# Patient Record
Sex: FEMALE | Race: WHITE | NOT HISPANIC OR LATINO | Employment: FULL TIME | URBAN - METROPOLITAN AREA
[De-identification: names, ages, dates, MRNs, and addresses within clinical notes are randomized per-mention and may not be internally consistent; named-entity substitution may affect disease eponyms.]

---

## 2017-02-18 ENCOUNTER — HOSPITAL ENCOUNTER (EMERGENCY)
Facility: HOSPITAL | Age: 32
Discharge: HOME | End: 2017-02-18
Attending: EMERGENCY MEDICINE
Payer: COMMERCIAL

## 2017-02-18 VITALS
OXYGEN SATURATION: 99 % | TEMPERATURE: 99 F | DIASTOLIC BLOOD PRESSURE: 76 MMHG | HEART RATE: 98 BPM | SYSTOLIC BLOOD PRESSURE: 134 MMHG | RESPIRATION RATE: 18 BRPM

## 2017-02-18 DIAGNOSIS — I47.10 SVT (SUPRAVENTRICULAR TACHYCARDIA): Primary | ICD-10-CM

## 2017-02-18 DIAGNOSIS — R00.2 PALPITATIONS: ICD-10-CM

## 2017-02-18 LAB
ALBUMIN SERPL BCP-MCNC: 3.4 G/DL
ALP SERPL-CCNC: 55 U/L
ALT SERPL W/O P-5'-P-CCNC: 27 U/L
ANION GAP SERPL CALC-SCNC: 11 MMOL/L
AST SERPL-CCNC: 21 U/L
B-HCG UR QL: NEGATIVE
BASOPHILS # BLD AUTO: 0.02 K/UL
BASOPHILS NFR BLD: 0.2 %
BILIRUB SERPL-MCNC: 0.9 MG/DL
BUN SERPL-MCNC: 14 MG/DL
CALCIUM SERPL-MCNC: 8.7 MG/DL
CHLORIDE SERPL-SCNC: 108 MMOL/L
CO2 SERPL-SCNC: 20 MMOL/L
CREAT SERPL-MCNC: 0.9 MG/DL
DIFFERENTIAL METHOD: ABNORMAL
EOSINOPHIL # BLD AUTO: 0.2 K/UL
EOSINOPHIL NFR BLD: 1.7 %
ERYTHROCYTE [DISTWIDTH] IN BLOOD BY AUTOMATED COUNT: 14.3 %
EST. GFR  (AFRICAN AMERICAN): >60 ML/MIN/1.73 M^2
EST. GFR  (NON AFRICAN AMERICAN): >60 ML/MIN/1.73 M^2
GLUCOSE SERPL-MCNC: 87 MG/DL
HCT VFR BLD AUTO: 43 %
HGB BLD-MCNC: 14.8 G/DL
LYMPHOCYTES # BLD AUTO: 5.2 K/UL
LYMPHOCYTES NFR BLD: 45.6 %
MCH RBC QN AUTO: 31.8 PG
MCHC RBC AUTO-ENTMCNC: 34.4 %
MCV RBC AUTO: 93 FL
MONOCYTES # BLD AUTO: 1.1 K/UL
MONOCYTES NFR BLD: 9.3 %
NEUTROPHILS # BLD AUTO: 4.9 K/UL
NEUTROPHILS NFR BLD: 43.2 %
PLATELET # BLD AUTO: 220 K/UL
PMV BLD AUTO: 11.4 FL
POTASSIUM SERPL-SCNC: 3.8 MMOL/L
PROT SERPL-MCNC: 6.8 G/DL
RBC # BLD AUTO: 4.65 M/UL
SODIUM SERPL-SCNC: 139 MMOL/L
TROPONIN I SERPL DL<=0.01 NG/ML-MCNC: 0.01 NG/ML
TSH SERPL DL<=0.005 MIU/L-ACNC: 2.83 UIU/ML
WBC # BLD AUTO: 11.35 K/UL

## 2017-02-18 PROCEDURE — 93005 ELECTROCARDIOGRAM TRACING: CPT

## 2017-02-18 PROCEDURE — 25000003 PHARM REV CODE 250: Performed by: EMERGENCY MEDICINE

## 2017-02-18 PROCEDURE — 99284 EMERGENCY DEPT VISIT MOD MDM: CPT | Mod: 25

## 2017-02-18 PROCEDURE — 93010 ELECTROCARDIOGRAM REPORT: CPT | Mod: ,,, | Performed by: INTERNAL MEDICINE

## 2017-02-18 PROCEDURE — 96374 THER/PROPH/DIAG INJ IV PUSH: CPT

## 2017-02-18 PROCEDURE — 84443 ASSAY THYROID STIM HORMONE: CPT

## 2017-02-18 PROCEDURE — 81025 URINE PREGNANCY TEST: CPT

## 2017-02-18 PROCEDURE — 85027 COMPLETE CBC AUTOMATED: CPT

## 2017-02-18 PROCEDURE — 84484 ASSAY OF TROPONIN QUANT: CPT

## 2017-02-18 PROCEDURE — 85007 BL SMEAR W/DIFF WBC COUNT: CPT

## 2017-02-18 PROCEDURE — 80053 COMPREHEN METABOLIC PANEL: CPT

## 2017-02-18 PROCEDURE — 96361 HYDRATE IV INFUSION ADD-ON: CPT

## 2017-02-18 PROCEDURE — 63600175 PHARM REV CODE 636 W HCPCS

## 2017-02-18 PROCEDURE — 36415 COLL VENOUS BLD VENIPUNCTURE: CPT

## 2017-02-18 RX ORDER — SODIUM CHLORIDE 9 MG/ML
1000 INJECTION, SOLUTION INTRAVENOUS
Status: COMPLETED | OUTPATIENT
Start: 2017-02-18 | End: 2017-02-18

## 2017-02-18 RX ORDER — ADENOSINE 3 MG/ML
INJECTION INTRAVENOUS
Status: COMPLETED
Start: 2017-02-18 | End: 2017-02-18

## 2017-02-18 RX ORDER — ADENOSINE 3 MG/ML
6 INJECTION INTRAVENOUS
Status: COMPLETED | OUTPATIENT
Start: 2017-02-18 | End: 2017-02-18

## 2017-02-18 RX ADMIN — ADENOSINE 6 MG: 3 SOLUTION INTRAVENOUS at 06:02

## 2017-02-18 RX ADMIN — SODIUM CHLORIDE 1000 ML: 0.9 INJECTION, SOLUTION INTRAVENOUS at 06:02

## 2017-02-18 RX ADMIN — ADENOSINE 6 MG: 3 INJECTION INTRAVENOUS at 06:02

## 2017-02-18 NOTE — ED PROVIDER NOTES
Encounter Date: 2/18/2017       History     Chief Complaint   Patient presents with    Tachycardia     Review of patient's allergies indicates:  No Known Allergies  HPI  History reviewed. No pertinent past medical history.  Past Medical History Pertinent Negatives   Diagnosis Date Noted    Abnormal Pap smear 9/2/2014    Abnormal Pap smear of cervix 9/13/2016    Abnormal Pap smear of vagina 9/29/2015    Coronary artery disease 2/18/2017     Past Surgical History   Procedure Laterality Date    [place] tooth extraction       Family History   Problem Relation Age of Onset    Colon cancer Paternal Aunt      great aunt    Breast cancer Neg Hx     Ovarian cancer Neg Hx     Uterine cancer Neg Hx     Cervical cancer Neg Hx      Social History   Substance Use Topics    Smoking status: Never Smoker    Smokeless tobacco: Never Used    Alcohol use 0.0 oz/week     0 Standard drinks or equivalent per week      Comment: seldom     Review of Systems    Physical Exam   Initial Vitals   BP Pulse Resp Temp SpO2   02/18/17 0649 02/18/17 0643 02/18/17 0649 02/18/17 0649 02/18/17 0649   155/90 210 20 98.6 °F (37 °C) 100 %     Physical Exam    ED Course   Procedures  Labs Reviewed   CBC W/ AUTO DIFFERENTIAL - Abnormal; Notable for the following:        Result Value    MCH 31.8 (*)     Lymph # 5.2 (*)     Mono # 1.1 (*)     All other components within normal limits    Narrative:     PLEASE REVIEW ORDER START TIME BEFORE MARKING SPECIMEN  COLLECTED.   COMPREHENSIVE METABOLIC PANEL - Abnormal; Notable for the following:     CO2 20 (*)     Albumin 3.4 (*)     All other components within normal limits   TROPONIN I   PREGNANCY TEST, URINE RAPID   TSH   TSH                               ED Course     Clinical Impression:   {Add your Clinical Impression here. If you haven't documented one yet, please pend the note, finalize a Clinical Impression, and refresh your note before signing.:[id number]}

## 2017-02-18 NOTE — ED AVS SNAPSHOT
[facility] -   [address] [place] [address]               Jo-Ann Ochoa   2017  6:46 AM   ED    Description:  Female : 1985   Department:  [facility] -            Your Care was Coordinated By:     Provider Role From To    Edson Wright MD Attending Provider 17 0657 --      Reason for Visit     Tachycardia           Diagnoses this Visit        Comments    SVT (supraventricular tachycardia)    -  Primary     Palpitations           ED Disposition     None           To Do List           Follow-up Information     Follow up with Sheeba Dowling MD. Schedule an appointment as soon as possible for a visit in 3 days.    Specialty:  Family Medicine    Why:  Follow-up with your primary care doctor in the next 2-3 days for recheck.  Return to emergency department for any worsening signs or symptoms    Contact information:    [address] [place] [address]  [phone]          Follow up with Asif Ochoa MD. Schedule an appointment as soon as possible for a visit in 1 week.    Specialty:  Cardiology    Why:  Follow-up with your cardiologist as discussed.  Discussed possible outpatient Holter monitor.  Return to emergency department for any worsening signs or symptoms.    Contact information:    [address]  [place] [address]  [phone]        [facility]     [facility] -  Assistance  Registered nurses in the [facility] provide clinical advisement, health education, appointment booking, and other advisory services.  Call for this free service at [phone].             Medications           These medications were administered today        Dose Freq    adenosine (ADENOCARD) 3 mg/mL injection      Notes to Pharmacy: Created by cabinet override    0.9%  NaCl infusion 1,000 mL ED 1 Time    Sig: Inject 1,000 mLs into the vein ED 1 Time.    Class: Normal    Route: Intravenous     adenosine injection 6 mg 6 mg ED 1 Time    Sig: Inject 2 mLs (6 mg total) into the vein ED 1 Time.    Class: Normal    Route: Intravenous           Verify that the below list of medications is an accurate representation of the medications you are currently taking.  If none reported, the list may be blank. If incorrect, please contact your healthcare provider. Carry this list with you in case of emergency.           Current Medications     levonorgestrel-ethinyl estradiol (SEASONALE) 0.15-30 mg-mcg per tablet Take 1 tablet by mouth once daily.           Clinical Reference Information           Your Vitals Were     BP Pulse Temp Resp Last Period SpO2    134/76 105 98.6 °F (37 °C) (Oral) 16 (LMP Unknown) 99%      Allergies as of 2/18/2017     No Known Allergies      Immunizations Administered on Date of Encounter - 2/18/2017     None      ED Micro, Lab, POCT     Start Ordered       Status Ordering Provider    02/18/17 0812 02/18/17 0812  Rapid Pregnancy, Urine  STAT      Acknowledged     02/18/17 0812 02/18/17 0812  TSH  Add-on      Completed     02/18/17 0728 02/18/17 0727  Troponin I  STAT      Final result     02/18/17 0727 02/18/17 0727  Comprehensive metabolic panel  STAT      Final result     02/18/17 0727 02/18/17 0727  TSH  Once      In process     02/18/17 0657 02/18/17 0657  CBC auto differential  STAT      Final result     02/18/17 0657 02/18/17 0657    STAT,   Status:  Canceled      Canceled     02/18/17 0657 02/18/17 0657  Troponin I  Now then every 3 hours     Comments:  PLEASE REVIEW ORDER START TIME BEFORE MARKING SPECIMEN COLLECTED.   Start Status   02/18/17 0657 Canceled   02/18/17 0957 Acknowledged       Acknowledged       ED Imaging Orders     Start Ordered       Status Ordering Provider    02/18/17 0658 02/18/17 0657  X-Ray Chest AP Portable  1 time imaging      Final result       Discharge References/Attachments     PALPITATIONS (ENGLISH)    TACHYCARDIA: PAT  (ENGLISH)    VALSALVA MANEUVER  (ENGLISH)       [facility] -  complies with applicable Federal civil rights laws and does not discriminate on the basis of race, color, national origin, age, disability, or sex.        Language Assistance Services     ATTENTION: Language assistance services are available, free of charge. Please call [phone].      ATENCIÓN: Si habla deepali, tiene a hong disposición servicios gratuitos de asistencia lingüística. Llame al [phone].     CHÚ Ý: N?u b?n nói Ti?ng Vi?t, có các d?ch v? h? tr? ngôn ng? mi?n phí dành cho b?n. G?i s? [phone].

## 2017-02-18 NOTE — ED NOTES
Patient sitting up in bed, no acute distress noted, awake, alert, and oriented x 3, calm, respirations even and unlabored, and skin is warm and dry. Patient verbalized understanding of status and plan of care. Patient denies needs at this time. Side rails up x 2, call light in reach, bed low and locked.Will continue to monitor.

## 2017-02-18 NOTE — ED PROVIDER NOTES
SCRIBE #1 NOTE: I, Deb Villalobos, am scribing for, and in the presence of, Edson Wright MD. I have scribed the entire note.      History      Chief Complaint   Patient presents with    Tachycardia       Review of patient's allergies indicates:  No Known Allergies     HPI   HPI    2/18/2017, 6:57 AM   History obtained from the patient      History of Present Illness: Jo-Ann Ochoa is a 31 y.o. female patient who presents to the Emergency Department for palpitations which onset gradually approximately 20 minutes ago. Symptoms are episodic and moderate in severity. 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 Pt reports that she then started to feel palpitations, which led her to feeling dizzy and SOB. Pt notes that she drinks caffeine regularly. Pt states that she had one cup of coffee before the beginning of her shift, and another mid-day. No mitigating or exacerbating factors reported. Patient denies any CP, leg swelling, cough, HA, nausea, vomiting, jaw pain, extremity weakness/numbness, and all other sxs at this time. No further complaints or concerns at this time.         Arrival mode: Personal vehicle    PCP: Sheeba Dowling MD       Past Medical History:  History reviewed. No pertinent past medical history.    Past Surgical History:  Past Surgical History   Procedure Laterality Date    [place] tooth extraction           Family History:  Family History   Problem Relation Age of Onset    Colon cancer Paternal Aunt      great aunt    Breast cancer Neg Hx     Ovarian cancer Neg Hx     Uterine cancer Neg Hx     Cervical cancer Neg Hx        Social History:  Social History     Social History Main Topics    Smoking status: Never Smoker    Smokeless tobacco: Never Used    Alcohol use 0.0 oz/week     0 Standard drinks or equivalent per week      Comment: seldom    Drug use: No    Sexual activity: Yes     Partners: Male     Birth control/ protection: OCP       Comment: mut monog       ROS   Review of Systems   Constitutional: Negative for fever.   HENT: Negative for sore throat.    Respiratory: Negative for chest tightness and shortness of breath.    Cardiovascular: Positive for palpitations. Negative for chest pain and leg swelling.   Gastrointestinal: Negative for nausea and vomiting.   Genitourinary: Negative for dysuria.   Musculoskeletal: Negative for back pain.   Skin: Negative for rash.   Neurological: Positive for dizziness. Negative for weakness, light-headedness and headaches.   Hematological: Does not bruise/bleed easily.       Physical Exam    Initial Vitals   BP Pulse Resp Temp SpO2   02/18/17 0649 02/18/17 0649 02/18/17 0649 02/18/17 0649 02/18/17 0649   155/90 210 20 98.6 °F (37 °C) 100 %      Physical Exam  Nursing Notes and Vital Signs Reviewed.  Constitutional: Patient is in no apparent distress. Awake and alert. Well-developed and well-nourished.  Head: Atraumatic. Normocephalic.  Eyes: PERRL. EOM intact. Conjunctivae are not pale. No scleral icterus.  ENT: Mucous membranes are moist. Oropharynx is clear and symmetric.    Neck: Supple. Full ROM. No lymphadenopathy.  Cardiovascular: Tachycardic. Regular rhythm. No murmurs, rubs, or gallops. Distal pulses are 2+ and symmetric.  Pulmonary/Chest: No respiratory distress. Clear to auscultation bilaterally. No wheezing, rales, or rhonchi.  Abdominal: Soft and non-distended.  There is no tenderness.  No rebound, guarding, or rigidity. Good bowel sounds.  Genitourinary: No CVA tenderness  Musculoskeletal: Moves all extremities. No obvious deformities. No edema. No calf tenderness.  Skin: Warm and dry.  Neurological:  Alert, awake, and appropriate.  Normal speech.  No acute focal neurological deficits are appreciated.  Psychiatric: Slightly anxious. Normal affect. Good eye contact. Appropriate in content.    ED Course    Procedures  ED Vital Signs:  Vitals:    02/18/17 0643 02/18/17 0649 02/18/17 0654 02/18/17  0700   BP:  (!) 155/90     Pulse: (!) 210 (!) 210 (!) 132 (!) 117   Resp:  20     Temp:  98.6 °F (37 °C)     TempSrc:  Oral     SpO2:  100%      02/18/17 0711 02/18/17 0807 02/18/17 0905   BP: 138/74 134/76    Pulse: (!) 113 105 98   Resp: 19 16 18   Temp:      TempSrc:      SpO2: 98% 99% 99%       Abnormal Lab Results:  Labs Reviewed   CBC W/ AUTO DIFFERENTIAL - Abnormal; Notable for the following:        Result Value    MCH 31.8 (*)     Lymph # 5.2 (*)     Mono # 1.1 (*)     All other components within normal limits    Narrative:     PLEASE REVIEW ORDER START TIME BEFORE MARKING SPECIMEN  COLLECTED.   COMPREHENSIVE METABOLIC PANEL - Abnormal; Notable for the following:     CO2 20 (*)     Albumin 3.4 (*)     All other components within normal limits   TROPONIN I   PREGNANCY TEST, URINE RAPID   TSH   TSH        All Lab Results:  Results for orders placed or performed during the hospital encounter of 02/18/17   CBC auto differential   Result Value Ref Range    WBC 11.35 3.90 - 12.70 K/uL    RBC 4.65 4.00 - 5.40 M/uL    Hemoglobin 14.8 12.0 - 16.0 g/dL    Hematocrit 43.0 37.0 - 48.5 %    MCV 93 82 - 98 fL    MCH 31.8 (H) 27.0 - 31.0 pg    MCHC 34.4 32.0 - 36.0 %    RDW 14.3 11.5 - 14.5 %    Platelets 220 150 - 350 K/uL    MPV 11.4 9.2 - 12.9 fL    Gran # 4.9 1.8 - 7.7 K/uL    Lymph # 5.2 (H) 1.0 - 4.8 K/uL    Mono # 1.1 (H) 0.3 - 1.0 K/uL    Eos # 0.2 0.0 - 0.5 K/uL    Baso # 0.02 0.00 - 0.20 K/uL    Gran% 43.2 38.0 - 73.0 %    Lymph% 45.6 18.0 - 48.0 %    Mono% 9.3 4.0 - 15.0 %    Eosinophil% 1.7 0.0 - 8.0 %    Basophil% 0.2 0.0 - 1.9 %    Differential Method Automated    Comprehensive metabolic panel   Result Value Ref Range    Sodium 139 136 - 145 mmol/L    Potassium 3.8 3.5 - 5.1 mmol/L    Chloride 108 95 - 110 mmol/L    CO2 20 (L) 23 - 29 mmol/L    Glucose 87 70 - 110 mg/dL    BUN, Bld 14 6 - 20 mg/dL    Creatinine 0.9 0.5 - 1.4 mg/dL    Calcium 8.7 8.7 - 10.5 mg/dL    Total Protein 6.8 6.0 - 8.4 g/dL    Albumin  3.4 (L) 3.5 - 5.2 g/dL    Total Bilirubin 0.9 0.1 - 1.0 mg/dL    Alkaline Phosphatase 55 55 - 135 U/L    AST 21 10 - 40 U/L    ALT 27 10 - 44 U/L    Anion Gap 11 8 - 16 mmol/L    eGFR if African American >60 >60 mL/min/1.73 m^2    eGFR if non African American >60 >60 mL/min/1.73 m^2   Troponin I   Result Value Ref Range    Troponin I 0.009 0.000 - 0.026 ng/mL   Rapid Pregnancy, Urine   Result Value Ref Range    Preg Test, Ur Negative    TSH   Result Value Ref Range    TSH 2.834 0.400 - 4.000 uIU/mL         Imaging Results:  Imaging Results         X-Ray Chest AP Portable (Final result) Result time:  02/18/17 08:07:19    Final result by Manny Hazel MD (02/18/17 08:07:19)    Impression:         Negative single view chest x-ray.      Electronically signed by: MANNY HAZEL MD  Date:     02/18/17  Time:    08:07     Narrative:    Procedure: XR CHEST AP PORTABLE, 02/18/17 07:37:58    Clinical indication:  Acute chest pain    Findings: Heart size is normal. The lung fields are clear. No acute cardiopulmonary infiltrate.             The EKG was ordered, reviewed, and independently interpreted by the ED provider.  Interpretation time: 6:44  Rate: 209 BPM  Rhythm: supraventricular tachycardia (SVT)  Interpretation: SVT at 209 baseline artifact. No STEMI.      The EKG was ordered, reviewed, and independently interpreted by the ED provider.  Interpretation time: 6:53  Rate: 125 BPM  Rhythm: sinus tachycardia  Interpretation: Non specific ST wave abnormalities. No STEMI.           The Emergency Provider reviewed the vital signs and test results, which are outlined above.    ED Discussion     8:54 AM: Pt pulse at 100, not currently c/o palpitations. Reassessed pt at this time.  Pt states her condition has improved at this time. Discussed with pt all pertinent ED information and results. Discussed pt dx and plan of tx. Gave pt all f/u and return to the ED instructions. All questions and concerns were addressed at this  time. Pt expresses understanding of information and instructions, and is comfortable with plan to discharge. Pt is stable for discharge.    I discussed with patient and/or family/caretaker that evaluation in the ED does not suggest any emergent or life threatening medical conditions requiring immediate intervention beyond what was provided in the ED, and I believe patient is safe for discharge.  Regardless, an unremarkable evaluation in the ED does not preclude the development or presence of a serious of life threatening condition. As such, patient was instructed to return immediately for any worsening or change in current symptoms.      ED Medication(s):  Medications   0.9%  NaCl infusion (0 mLs Intravenous Stopped 2/18/17 0852)   adenosine injection 6 mg (6 mg Intravenous Given 2/18/17 0650)       Discharge Medication List as of 2/18/2017  8:37 AM          Follow-up Information     Follow up with Sheeba Dowling MD. Schedule an appointment as soon as possible for a visit in 3 days.    Specialty:  Family Medicine    Why:  Follow-up with your primary care doctor in the next 2-3 days for recheck.  Return to emergency department for any worsening signs or symptoms    Contact information:    [address] [place] [address] [address] [place] [address]  [phone]          Follow up with Asif Ochoa MD. Schedule an appointment as soon as possible for a visit in 1 week.    Specialty:  Cardiology    Why:  Follow-up with your cardiologist as discussed.  Discussed possible outpatient Holter monitor.  Return to emergency department for any worsening signs or symptoms.    Contact information:    [address]  [place] [address]  [phone]              Medical Decision Making    Medical Decision Making:   Clinical Tests:   Lab Tests: Ordered and Reviewed  Radiological Study: Ordered and Reviewed           Scribe Attestation:   Scribe #1: I performed the above scribed service and the documentation accurately describes the  services I performed. I attest to the accuracy of the note.    Attending:   Physician Attestation Statement for Scribe #1: I, Edson Wright MD, personally performed the services described in this documentation, as scribed by , in my presence, and it is both accurate and complete.         Attending Attestation:         Attending Critical Care:   Critical Care Times:   Direct Patient Care (initial evaluation, reassessments, and time considering the case)................................................................10 minutes.   Ordering, Reviewing, and Interpreting Diagnostic Studies...............................................................................................................10 minutes.   Documentation..................................................................................................................................................................................10 minutes.   ==============================================================  · Total Critical Care Time - exclusive of procedural time: 30 minutes.  ==============================================================  Critical care was necessary to treat or prevent imminent or life-threatening deterioration of the following conditions: cardiac arrhythmia.   Critical care was time spent personally by me on the following activities: obtaining history from patient or relative, examination of patient, ordering lab, x-rays, and/or EKG, development of treatment plan with patient or relative, evaluation of patient's response to treatment, ordering and performing treatments and interventions and re-evaluation of patient's conition.   Critical Care Condition: critical           Clinical Impression       ICD-10-CM ICD-9-CM   1. SVT (supraventricular tachycardia) I47.1 427.89   2. Palpitations R00.2 785.1       Disposition:   Disposition: Discharged  Condition: Stable         Edson Wright MD  02/18/17 9580

## 2017-02-18 NOTE — ED NOTES
Patient sitting up in bed, no acute distress noted, awake, alert, and oriented x 3, calm, respirations even and unlabored, and skin is warm and dry. Patient verbalized understanding of status and plan of care. Patient denies needs at this time. Side rails up x 2, call light in reach, bed low and locked. Will continue to monitor.

## 2017-02-18 NOTE — ED TRIAGE NOTES
"Patient complains of elevated heart rate between 180-220 over past 15 minutes.  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 Patient describes symptoms as "heart is racing" and "mild shortness of breath". Previous treatment includes nothing. Patient denies chest pain.    Patient verbalized understanding of status and plan of care at this time. Armband checked, patient verified. Verified by spelling and stated name on armband along with .     "

## 2017-03-01 ENCOUNTER — OFFICE VISIT (OUTPATIENT)
Dept: CARDIOLOGY | Facility: CLINIC | Age: 32
End: 2017-03-01
Payer: COMMERCIAL

## 2017-03-01 VITALS
DIASTOLIC BLOOD PRESSURE: 82 MMHG | HEIGHT: 67 IN | HEART RATE: 84 BPM | WEIGHT: 157.19 LBS | SYSTOLIC BLOOD PRESSURE: 120 MMHG | BODY MASS INDEX: 24.67 KG/M2

## 2017-03-01 DIAGNOSIS — I47.10 PSVT (PAROXYSMAL SUPRAVENTRICULAR TACHYCARDIA): Primary | ICD-10-CM

## 2017-03-01 PROCEDURE — 99204 OFFICE O/P NEW MOD 45 MIN: CPT | Mod: S$GLB,,, | Performed by: INTERNAL MEDICINE

## 2017-03-01 PROCEDURE — 99999 PR PBB SHADOW E&M-EST. PATIENT-LVL III: CPT | Mod: PBBFAC,,, | Performed by: INTERNAL MEDICINE

## 2017-03-01 RX ORDER — CETIRIZINE HYDROCHLORIDE 10 MG/1
10 TABLET ORAL DAILY
Status: ON HOLD | COMMUNITY
End: 2020-08-27 | Stop reason: HOSPADM

## 2017-03-01 RX ORDER — ACETAMINOPHEN, DIPHENHYDRAMINE HCL, PHENYLEPHRINE HCL 325; 25; 5 MG/1; MG/1; MG/1
10 TABLET ORAL NIGHTLY
COMMUNITY
End: 2020-12-22

## 2017-03-01 NOTE — PROGRESS NOTES
Subjective:   Patient ID:  Jo-Ann Ochoa is a 31 y.o. female who presents for evaluation of Tachycardia      HPI  A 30 yo female with negative pmhx had an episode pxvt while at work she felt it suddenly. She has not had any previous episodes. Had some shortness of breath with cold sweat no chest pain she did not break with valsalva had adenosine converted to sinus rythm. She did not have anything unusual occur with her. Had 2 cups of coffee. No previous h/o palpitation. She exercises regularily w/o any symptoms no alcohol she slowed down her soft drinks.   Past Medical History:   Diagnosis Date    PSVT (paroxysmal supraventricular tachycardia) 3/1/2017    Supraventricular tachycardia        Past Surgical History:   Procedure Laterality Date    WISDOM TOOTH EXTRACTION         Social History   Substance Use Topics    Smoking status: Never Smoker    Smokeless tobacco: Never Used    Alcohol use 0.0 oz/week     0 Standard drinks or equivalent per week      Comment: seldom       Family History   Problem Relation Age of Onset    Mitral valve prolapse Mother     Colon cancer Paternal Aunt      great aunt    Atrial fibrillation Paternal Grandfather     Breast cancer Neg Hx     Ovarian cancer Neg Hx     Uterine cancer Neg Hx     Cervical cancer Neg Hx        Current Outpatient Prescriptions   Medication Sig    cetirizine (ZYRTEC) 10 MG tablet Take 10 mg by mouth once daily.    levonorgestrel-ethinyl estradiol (SEASONALE) 0.15-30 mg-mcg per tablet Take 1 tablet by mouth once daily.    melatonin 10 mg Tab Take 10 mg by mouth every evening.     No current facility-administered medications for this visit.      Current Outpatient Prescriptions on File Prior to Visit   Medication Sig    levonorgestrel-ethinyl estradiol (SEASONALE) 0.15-30 mg-mcg per tablet Take 1 tablet by mouth once daily.     No current facility-administered medications on file prior to visit.        Review of patient's allergies  indicates:  No Known Allergies    Review of Systems   Constitution: Negative for diaphoresis, weakness, malaise/fatigue and weight gain.   HENT: Negative for headaches and hoarse voice.    Eyes: Negative for double vision and visual disturbance.   Cardiovascular: Positive for palpitations. Negative for chest pain, claudication, cyanosis, dyspnea on exertion, irregular heartbeat, leg swelling, near-syncope, orthopnea, paroxysmal nocturnal dyspnea and syncope.   Respiratory: Negative for cough, hemoptysis, shortness of breath and snoring.    Hematologic/Lymphatic: Negative for bleeding problem. Does not bruise/bleed easily.   Skin: Negative for color change and poor wound healing.   Musculoskeletal: Negative for muscle cramps, muscle weakness and myalgias.   Gastrointestinal: Negative for bloating, abdominal pain, change in bowel habit, diarrhea, heartburn, hematemesis, hematochezia, melena and nausea.   Neurological: Negative for excessive daytime sleepiness, dizziness, light-headedness, loss of balance and numbness.   Psychiatric/Behavioral: Negative for memory loss. The patient does not have insomnia.    Allergic/Immunologic: Negative for hives.       Objective:   Physical Exam   Constitutional: She is oriented to person, place, and time. She appears well-developed and well-nourished. She does not appear ill. No distress.   HENT:   Head: Normocephalic and atraumatic.   Eyes: EOM are normal. Pupils are equal, round, and reactive to light. No scleral icterus.   Neck: Normal range of motion. Neck supple. Normal carotid pulses, no hepatojugular reflux and no JVD present. Carotid bruit is not present. No tracheal deviation present. No thyromegaly present.   Cardiovascular: Normal rate, regular rhythm, normal heart sounds and normal pulses.  Exam reveals no gallop and no friction rub.    No murmur heard.  Pulmonary/Chest: Effort normal and breath sounds normal. No respiratory distress. She has no wheezes. She has no  "rhonchi. She has no rales. She exhibits no tenderness.   Abdominal: Soft. Normal appearance, normal aorta and bowel sounds are normal. She exhibits no abdominal bruit, no ascites and no pulsatile midline mass. There is no hepatomegaly. There is no tenderness.   Musculoskeletal: She exhibits no edema.        Right shoulder: She exhibits no deformity.   Neurological: She is alert and oriented to person, place, and time. She has normal strength. No cranial nerve deficit. Coordination normal.   Skin: Skin is warm and dry. No rash noted. No cyanosis or erythema. Nails show no clubbing.   Psychiatric: She has a normal mood and affect. Her speech is normal and behavior is normal.     Vitals:    03/01/17 1400   BP: 120/82   Pulse: 84   Weight: 71.3 kg (157 lb 3 oz)   Height: 5' 7" (1.702 m)     Lab Results   Component Value Date    CHOL 175 10/17/2014     Lab Results   Component Value Date    HDL 70 10/17/2014     Lab Results   Component Value Date    LDLCALC 81.6 10/17/2014     Lab Results   Component Value Date    TRIG 117 10/17/2014     Lab Results   Component Value Date    CHOLHDL 40.0 10/17/2014       Chemistry        Component Value Date/Time     02/18/2017 0734    K 3.8 02/18/2017 0734     02/18/2017 0734    CO2 20 (L) 02/18/2017 0734    BUN 14 02/18/2017 0734    CREATININE 0.9 02/18/2017 0734    GLU 87 02/18/2017 0734        Component Value Date/Time    CALCIUM 8.7 02/18/2017 0734    ALKPHOS 55 02/18/2017 0734    AST 21 02/18/2017 0734    ALT 27 02/18/2017 0734    BILITOT 0.9 02/18/2017 0734          Lab Results   Component Value Date    TSH 2.834 02/18/2017     No results found for: INR, PROTIME  Lab Results   Component Value Date    WBC 11.35 02/18/2017    HGB 14.8 02/18/2017    HCT 43.0 02/18/2017    MCV 93 02/18/2017     02/18/2017     BNP  @LABRCNTIP(BNP,BNPTRIAGEBLO)@  Estimated Creatinine Clearance: 89.7 mL/min (based on Cr of 0.9).  Assessment:     1. PSVT (paroxysmal supraventricular " tachycardia)      psvt trigger ? Stress at work.  however weas counseled about triggers sleep pattern hydration. Avoiding caffeine and stimulants.no therapy pharmacologically for now unless has recurrence then will get ep to see for ablation.  Plan:   Reassure   hydration   Echo holter.  No therapy for now  contine exercise   Good sleep hygiene   Stay well hydrated   Avoid stimulants,

## 2017-03-01 NOTE — MR AVS SNAPSHOT
[facility]  [address] [place] [address]  Phone: [phone]  Fax: [phone]                  Jo-Ann Ochoa   3/1/2017 2:00 PM   Office Visit    Description:  Female : 1985   Provider:  Milady Friedman MD   Department:  [facility]           Reason for Visit     Tachycardia           Diagnoses this Visit        Comments    PSVT (paroxysmal supraventricular tachycardia)    -  Primary            To Do List           Future Appointments        Provider Department Dept Phone    3/7/2017 3:20 PM Sheeba Dowling MD '[facility] - [facility] [phone]      Goals (5 Years of Data)     None      [facility] On Call Nurse Care Line -  Assistance  Registered nurses in the [facility] On Call Center provide clinical advisement, health education, appointment booking, and other advisory services.  Call for this free service at [phone].             Medications                Verify that the below list of medications is an accurate representation of the medications you are currently taking.  If none reported, the list may be blank. If incorrect, please contact your healthcare provider. Carry this list with you in case of emergency.           Current Medications     cetirizine (ZYRTEC) 10 MG tablet Take 10 mg by mouth once daily.    levonorgestrel-ethinyl estradiol (SEASONALE) 0.15-30 mg-mcg per tablet Take 1 tablet by mouth once daily.    melatonin 10 mg Tab Take 10 mg by mouth every evening.           Clinical Reference Information           Your Vitals Were     BP                   120/82           Blood Pressure          Most Recent Value    Right Arm BP - Sitting  120/82    Left Arm BP - Sitting  122/82    BP  120/82      Allergies as of 3/1/2017     No Known Allergies      Immunizations Administered on Date of Encounter - 3/1/2017     None      Orders Placed During Today's Visit      Normal Orders This Visit    Holter monitor - 48 hour     Future  Labs/Procedures Expected by Expires    2D echo with color flow doppler  As directed 3/1/2018      Language Assistance Services     ATTENTION: Language assistance services are available, free of charge. Please call [phone].      ATENCIÓN: Si kemal palumbo, tiene a hong disposición servicios gratuitos de asistencia lingüística. Llame al [phone].     [facility] Ý: N?u b?n nói Ti?ng Vi?t, có các d?ch v? h? tr? ngôn ng? mi?n phí dành cho b?n. G?i s? [phone].         [facility] complies with applicable Federal civil rights laws and does not discriminate on the basis of race, color, national origin, age, disability, or sex.

## 2017-03-08 ENCOUNTER — CLINICAL SUPPORT (OUTPATIENT)
Dept: CARDIOLOGY | Facility: CLINIC | Age: 32
End: 2017-03-08
Payer: COMMERCIAL

## 2017-03-08 DIAGNOSIS — I47.10 PSVT (PAROXYSMAL SUPRAVENTRICULAR TACHYCARDIA): ICD-10-CM

## 2017-03-08 LAB
DIASTOLIC DYSFUNCTION: NO
ESTIMATED PA SYSTOLIC PRESSURE: 17.59
RETIRED EF AND QEF - SEE NOTES: 60 (ref 55–65)
TRICUSPID VALVE REGURGITATION: NORMAL

## 2017-03-08 PROCEDURE — 93306 TTE W/DOPPLER COMPLETE: CPT | Mod: S$GLB,,, | Performed by: INTERNAL MEDICINE

## 2017-03-14 ENCOUNTER — TELEPHONE (OUTPATIENT)
Dept: CARDIOLOGY | Facility: CLINIC | Age: 32
End: 2017-03-14

## 2017-03-14 NOTE — TELEPHONE ENCOUNTER
----- Message from Milady Friedman MD sent at 3/13/2017  7:40 PM CDT -----  Has few skipped beats but no significant rhythm issues

## 2017-06-30 ENCOUNTER — TELEPHONE (OUTPATIENT)
Dept: INTERNAL MEDICINE | Facility: CLINIC | Age: 32
End: 2017-06-30

## 2017-06-30 ENCOUNTER — OFFICE VISIT (OUTPATIENT)
Dept: INTERNAL MEDICINE | Facility: CLINIC | Age: 32
End: 2017-06-30
Payer: COMMERCIAL

## 2017-06-30 ENCOUNTER — HOSPITAL ENCOUNTER (OUTPATIENT)
Dept: RADIOLOGY | Facility: HOSPITAL | Age: 32
Discharge: HOME | End: 2017-06-30
Attending: FAMILY MEDICINE
Payer: COMMERCIAL

## 2017-06-30 VITALS
DIASTOLIC BLOOD PRESSURE: 80 MMHG | HEART RATE: 100 BPM | WEIGHT: 153.69 LBS | BODY MASS INDEX: 24.12 KG/M2 | TEMPERATURE: 99 F | OXYGEN SATURATION: 98 % | SYSTOLIC BLOOD PRESSURE: 126 MMHG | HEIGHT: 67 IN

## 2017-06-30 DIAGNOSIS — R20.0 NUMBNESS AND TINGLING OF LEFT ARM AND LEG: ICD-10-CM

## 2017-06-30 DIAGNOSIS — R20.0 NUMBNESS AND TINGLING OF LEFT ARM AND LEG: Primary | ICD-10-CM

## 2017-06-30 DIAGNOSIS — R20.2 NUMBNESS AND TINGLING OF LEFT ARM AND LEG: Primary | ICD-10-CM

## 2017-06-30 DIAGNOSIS — R20.2 NUMBNESS AND TINGLING OF LEFT ARM AND LEG: ICD-10-CM

## 2017-06-30 PROCEDURE — 70551 MRI BRAIN STEM W/O DYE: CPT | Mod: TC,PO

## 2017-06-30 PROCEDURE — 99214 OFFICE O/P EST MOD 30 MIN: CPT | Mod: S$GLB,,, | Performed by: PHYSICIAN ASSISTANT

## 2017-06-30 PROCEDURE — 99999 PR PBB SHADOW E&M-EST. PATIENT-LVL IV: CPT | Mod: PBBFAC,,, | Performed by: PHYSICIAN ASSISTANT

## 2017-06-30 PROCEDURE — 70551 MRI BRAIN STEM W/O DYE: CPT | Mod: 26,,, | Performed by: RADIOLOGY

## 2017-06-30 NOTE — PROGRESS NOTES
Subjective:       Patient ID: Jo-Ann Ochoa is a 32 y.o. female.    Chief Complaint: numbness and tingling    Patient is a 33 yo female who reports intermittent left upper and lower extremity sensory loss for 48 hours.       Neurologic Problem   The patient's primary symptoms include focal sensory loss. The patient's pertinent negatives include no syncope or weakness. This is a new problem. The current episode started yesterday. The neurological problem developed suddenly. The problem has been waxing and waning since onset. There was left-sided, lower extremity and upper extremity focality noted. Associated symptoms include headaches. Pertinent negatives include no abdominal pain, auditory change, aura, back pain, bladder incontinence, bowel incontinence, chest pain, confusion, diaphoresis, dizziness, fatigue, fever, light-headedness, nausea, neck pain, palpitations, shortness of breath, vertigo or vomiting. Past treatments include nothing. There is no history of a CVA, head trauma or seizures.     Past Medical History:   Diagnosis Date    PSVT (paroxysmal supraventricular tachycardia) 3/1/2017    Supraventricular tachycardia        Past Surgical History:   Procedure Laterality Date    WISDOM TOOTH EXTRACTION         Family History   Problem Relation Age of Onset    Mitral valve prolapse Mother     Colon cancer Paternal Aunt      great aunt    Atrial fibrillation Paternal Grandfather     Breast cancer Neg Hx     Ovarian cancer Neg Hx     Uterine cancer Neg Hx     Cervical cancer Neg Hx        Social History     Social History    Marital status: Single     Spouse name: N/A    Number of children: N/A    Years of education: N/A     Occupational History    RN [employer]     Social History Main Topics    Smoking status: Never Smoker    Smokeless tobacco: Never Used    Alcohol use 0.0 oz/week      Comment: seldom    Drug use: No    Sexual activity: Yes     Partners: Male     Birth  "control/ protection: OCP      Comment: mut monog     Other Topics Concern    Not on file     Social History Narrative    No narrative on file       Review of patient's allergies indicates:  No Known Allergies      Current Outpatient Prescriptions:     levonorgestrel-ethinyl estradiol (SEASONALE) 0.15-30 mg-mcg per tablet, Take 1 tablet by mouth once daily., Disp: 90 tablet, Rfl: 3    cetirizine (ZYRTEC) 10 MG tablet, Take 10 mg by mouth once daily., Disp: , Rfl:     melatonin 10 mg Tab, Take 10 mg by mouth every evening., Disp: , Rfl:     /80 (BP Location: Right arm, Patient Position: Sitting, BP Method: Manual)   Pulse 100   Temp 99.1 °F (37.3 °C) (Tympanic)   Ht 5' 7" (1.702 m)   Wt 69.7 kg (153 lb 10.6 oz)   SpO2 98%   BMI 24.07 kg/m²   Review of Systems   Constitutional: Negative for activity change, diaphoresis, fatigue, fever and unexpected weight change.   HENT: Negative for hearing loss, rhinorrhea and trouble swallowing.    Eyes: Negative for discharge and visual disturbance.   Respiratory: Negative for chest tightness, shortness of breath and wheezing.    Cardiovascular: Negative for chest pain and palpitations.   Gastrointestinal: Negative for abdominal pain, blood in stool, bowel incontinence, constipation, diarrhea, nausea and vomiting.   Endocrine: Negative for polyuria.   Genitourinary: Negative for bladder incontinence, difficulty urinating, dysuria, hematuria and menstrual problem.   Musculoskeletal: Negative for arthralgias, back pain, joint swelling and neck pain.   Neurological: Positive for numbness and headaches. Negative for dizziness, vertigo, tremors, seizures, syncope, facial asymmetry, speech difficulty, weakness and light-headedness.   Psychiatric/Behavioral: Negative for confusion and dysphoric mood.       Objective:      Physical Exam   Constitutional: She is oriented to person, place, and time. She appears well-developed and well-nourished. No distress.   HENT:   Head: " Normocephalic and atraumatic.   Right Ear: Tympanic membrane and ear canal normal.   Left Ear: Tympanic membrane and ear canal normal.   Neck: Neck supple.   Cardiovascular: Normal rate and regular rhythm.  Exam reveals no gallop and no friction rub.    No murmur heard.  Pulmonary/Chest: Effort normal and breath sounds normal. No respiratory distress. She has no wheezes. She has no rales. She exhibits no tenderness.   Abdominal: Soft. Bowel sounds are normal. She exhibits no distension and no mass. There is no tenderness. There is no rebound and no guarding. No hernia.   Musculoskeletal: Normal range of motion. She exhibits no edema, tenderness or deformity.   Lymphadenopathy:     She has no cervical adenopathy.   Neurological: She is alert and oriented to person, place, and time. She displays normal reflexes. No cranial nerve deficit. She exhibits normal muscle tone. Coordination normal.   Skin: Skin is warm. She is not diaphoretic.   Psychiatric: She has a normal mood and affect.   Nursing note and vitals reviewed.      Assessment:       1. Numbness and tingling of left arm and leg        Plan:       Numbness and tingling of left arm and leg  -     MRI Brain W WO Contrast; Future; Expected date: 06/30/2017

## 2017-06-30 NOTE — TELEPHONE ENCOUNTER
----- Message from Gildardo Villatoro III, PA-C sent at 6/30/2017  3:32 PM CDT -----  Here is a copy of your report as discussed by phone. Go to the ER as suggested.

## 2017-07-03 ENCOUNTER — TELEPHONE (OUTPATIENT)
Dept: INTERNAL MEDICINE | Facility: CLINIC | Age: 32
End: 2017-07-03

## 2017-07-03 ENCOUNTER — ANTI-COAG VISIT (OUTPATIENT)
Dept: CARDIOLOGY | Facility: CLINIC | Age: 32
End: 2017-07-03

## 2017-07-03 DIAGNOSIS — G08 THROMBOSIS OF SUPERIOR SAGITTAL SINUS: Primary | ICD-10-CM

## 2017-07-03 NOTE — TELEPHONE ENCOUNTER
----- Message from Juana Diaz PharmD sent at 7/3/2017  3:22 PM CDT -----  Patient is being discharged from [facility] on warfarin per Dr. Corley. I spoke with him today and patient is being discharge on a lovenox bridge and will need an INR this week. I need an ok to enroll patient. I already created the order but need to verify that this is ok with Dr. Dowling.

## 2017-07-03 NOTE — TELEPHONE ENCOUNTER
I don't manage coumadin, so we will need to schedule patient with appropriate specialist. What is the reason for the coumadin? Also, important that she keep follow up appt scheduled for July 11th.

## 2017-07-04 NOTE — TELEPHONE ENCOUNTER
Dr Corley is the physician I spoke with, he would better be able to help decide which specialty patient should be referred to. She is being bridge with lovenox so needs to be seen ASAP. His cell number is [phone]. Please set the appropriate follow up for patient if possible as she is critical and needs to get seen ASAP.

## 2017-07-05 NOTE — PROGRESS NOTES
Patient not eligible for enrollment into the [facility]. She is going to be followed by Dr. Miranda, referral pending. She needs to have an INR checked tomorrow. I left a message as well as will be sending a fax to their office to see if this can be arranged for patients.

## 2017-07-05 NOTE — TELEPHONE ENCOUNTER
----- Message from Destiny Vazquez sent at 7/5/2017 10:46 AM CDT -----  Contact: Megan NOEL - [facility] hospitalist  They discharged patient yesterday and was put on coumadin.  She called the coumadin clinic on Monday and left a message and they have not called her or the patient to schedule an appointment.   She needs to have her INR done tomorrow, July 7.   Call her at [phone].                                                           [place]

## 2017-07-05 NOTE — TELEPHONE ENCOUNTER
She can come in tomorrow to have PT/INR drawn. We need to find out what her INR goal is to enter coumadin clinic order.

## 2017-07-06 NOTE — TELEPHONE ENCOUNTER
----- Message from Juana Diaz PharmD sent at 7/6/2017 12:00 PM CDT -----  Monitoring for warfarin has been set up at [facility].

## 2017-09-06 PROBLEM — Z79.01 ANTICOAGULANT LONG-TERM USE: Status: ACTIVE | Noted: 2017-09-06

## 2017-09-15 DIAGNOSIS — Z30.8 ENCOUNTER FOR OTHER CONTRACEPTIVE MANAGEMENT: ICD-10-CM

## 2017-09-15 RX ORDER — LEVONORGESTREL AND ETHINYL ESTRADIOL 0.15-0.03
1 KIT ORAL DAILY
Qty: 91 TABLET | Refills: 3 | Status: ON HOLD | OUTPATIENT
Start: 2017-09-15 | End: 2020-08-27 | Stop reason: HOSPADM

## 2020-08-25 ENCOUNTER — NURSE TRIAGE (OUTPATIENT)
Dept: ADMINISTRATIVE | Facility: CLINIC | Age: 35
End: 2020-08-25

## 2020-08-25 ENCOUNTER — HOSPITAL ENCOUNTER (INPATIENT)
Facility: HOSPITAL | Age: 35
LOS: 2 days | Discharge: HOME | DRG: 832 | End: 2020-08-27
Attending: EMERGENCY MEDICINE | Admitting: INTERNAL MEDICINE
Payer: COMMERCIAL

## 2020-08-25 DIAGNOSIS — R04.2 HEMOPTYSIS: ICD-10-CM

## 2020-08-25 DIAGNOSIS — I26.99 BILATERAL PULMONARY EMBOLISM: Primary | ICD-10-CM

## 2020-08-25 DIAGNOSIS — R06.00 DYSPNEA: ICD-10-CM

## 2020-08-25 PROBLEM — Z34.91 FIRST TRIMESTER PREGNANCY: Status: ACTIVE | Noted: 2020-08-25

## 2020-08-25 LAB
ALBUMIN SERPL BCP-MCNC: 3.3 G/DL (ref 3.5–5.2)
ALP SERPL-CCNC: 85 U/L (ref 55–135)
ALT SERPL W/O P-5'-P-CCNC: 12 U/L (ref 10–44)
ANION GAP SERPL CALC-SCNC: 13 MMOL/L (ref 8–16)
APTT BLDCRRT: 27.5 SEC (ref 21–32)
AST SERPL-CCNC: 18 U/L (ref 10–40)
BASOPHILS # BLD AUTO: 0.02 K/UL (ref 0–0.2)
BASOPHILS # BLD AUTO: 0.02 K/UL (ref 0–0.2)
BASOPHILS NFR BLD: 0.2 % (ref 0–1.9)
BASOPHILS NFR BLD: 0.2 % (ref 0–1.9)
BILIRUB SERPL-MCNC: 0.7 MG/DL (ref 0.1–1)
BILIRUB UR QL STRIP: NEGATIVE
BNP SERPL-MCNC: 41 PG/ML (ref 0–99)
BUN SERPL-MCNC: 9 MG/DL (ref 6–20)
CALCIUM SERPL-MCNC: 9 MG/DL (ref 8.7–10.5)
CHLORIDE SERPL-SCNC: 104 MMOL/L (ref 95–110)
CLARITY UR: CLEAR
CO2 SERPL-SCNC: 19 MMOL/L (ref 23–29)
COLOR UR: YELLOW
CREAT SERPL-MCNC: 0.7 MG/DL (ref 0.5–1.4)
D DIMER PPP IA.FEU-MCNC: 2.1 MG/L FEU
DIFFERENTIAL METHOD: ABNORMAL
DIFFERENTIAL METHOD: ABNORMAL
EOSINOPHIL # BLD AUTO: 0.1 K/UL (ref 0–0.5)
EOSINOPHIL # BLD AUTO: 0.1 K/UL (ref 0–0.5)
EOSINOPHIL NFR BLD: 0.4 % (ref 0–8)
EOSINOPHIL NFR BLD: 0.8 % (ref 0–8)
ERYTHROCYTE [DISTWIDTH] IN BLOOD BY AUTOMATED COUNT: 12.9 % (ref 11.5–14.5)
ERYTHROCYTE [DISTWIDTH] IN BLOOD BY AUTOMATED COUNT: 13.1 % (ref 11.5–14.5)
EST. GFR  (AFRICAN AMERICAN): >60 ML/MIN/1.73 M^2
EST. GFR  (NON AFRICAN AMERICAN): >60 ML/MIN/1.73 M^2
GLUCOSE SERPL-MCNC: 84 MG/DL (ref 70–110)
GLUCOSE UR QL STRIP: NEGATIVE
HCT VFR BLD AUTO: 41.9 % (ref 37–48.5)
HCT VFR BLD AUTO: 45.5 % (ref 37–48.5)
HGB BLD-MCNC: 14.2 G/DL (ref 12–16)
HGB BLD-MCNC: 15.2 G/DL (ref 12–16)
HGB UR QL STRIP: NEGATIVE
IMM GRANULOCYTES # BLD AUTO: 0.04 K/UL (ref 0–0.04)
IMM GRANULOCYTES # BLD AUTO: 0.05 K/UL (ref 0–0.04)
IMM GRANULOCYTES NFR BLD AUTO: 0.3 % (ref 0–0.5)
IMM GRANULOCYTES NFR BLD AUTO: 0.5 % (ref 0–0.5)
INR PPP: 0.9 (ref 0.8–1.2)
KETONES UR QL STRIP: NEGATIVE
LEUKOCYTE ESTERASE UR QL STRIP: NEGATIVE
LYMPHOCYTES # BLD AUTO: 1.5 K/UL (ref 1–4.8)
LYMPHOCYTES # BLD AUTO: 1.7 K/UL (ref 1–4.8)
LYMPHOCYTES NFR BLD: 14 % (ref 18–48)
LYMPHOCYTES NFR BLD: 14.2 % (ref 18–48)
MCH RBC QN AUTO: 31.1 PG (ref 27–31)
MCH RBC QN AUTO: 31.3 PG (ref 27–31)
MCHC RBC AUTO-ENTMCNC: 33.4 G/DL (ref 32–36)
MCHC RBC AUTO-ENTMCNC: 33.9 G/DL (ref 32–36)
MCV RBC AUTO: 92 FL (ref 82–98)
MCV RBC AUTO: 94 FL (ref 82–98)
MONOCYTES # BLD AUTO: 0.4 K/UL (ref 0.3–1)
MONOCYTES # BLD AUTO: 0.4 K/UL (ref 0.3–1)
MONOCYTES NFR BLD: 3.4 % (ref 4–15)
MONOCYTES NFR BLD: 4 % (ref 4–15)
NEUTROPHILS # BLD AUTO: 10 K/UL (ref 1.8–7.7)
NEUTROPHILS # BLD AUTO: 8.2 K/UL (ref 1.8–7.7)
NEUTROPHILS NFR BLD: 80.3 % (ref 38–73)
NEUTROPHILS NFR BLD: 81.7 % (ref 38–73)
NITRITE UR QL STRIP: NEGATIVE
NRBC BLD-RTO: 0 /100 WBC
NRBC BLD-RTO: 0 /100 WBC
PH UR STRIP: 7 [PH] (ref 5–8)
PLATELET # BLD AUTO: 179 K/UL (ref 150–350)
PLATELET # BLD AUTO: 179 K/UL (ref 150–350)
PMV BLD AUTO: 10.6 FL (ref 9.2–12.9)
PMV BLD AUTO: 10.7 FL (ref 9.2–12.9)
POTASSIUM SERPL-SCNC: 3.8 MMOL/L (ref 3.5–5.1)
PROT SERPL-MCNC: 7.3 G/DL (ref 6–8.4)
PROT UR QL STRIP: NEGATIVE
PROTHROMBIN TIME: 10.1 SEC (ref 9–12.5)
RBC # BLD AUTO: 4.57 M/UL (ref 4–5.4)
RBC # BLD AUTO: 4.85 M/UL (ref 4–5.4)
SARS-COV-2 RDRP RESP QL NAA+PROBE: NEGATIVE
SODIUM SERPL-SCNC: 136 MMOL/L (ref 136–145)
SP GR UR STRIP: 1.01 (ref 1–1.03)
TROPONIN I SERPL DL<=0.01 NG/ML-MCNC: <0.006 NG/ML (ref 0–0.03)
URN SPEC COLLECT METH UR: NORMAL
UROBILINOGEN UR STRIP-ACNC: NEGATIVE EU/DL
WBC # BLD AUTO: 10.26 K/UL (ref 3.9–12.7)
WBC # BLD AUTO: 12.26 K/UL (ref 3.9–12.7)

## 2020-08-25 PROCEDURE — 81003 URINALYSIS AUTO W/O SCOPE: CPT

## 2020-08-25 PROCEDURE — 85730 THROMBOPLASTIN TIME PARTIAL: CPT | Mod: 91

## 2020-08-25 PROCEDURE — 25000003 PHARM REV CODE 250: Performed by: INTERNAL MEDICINE

## 2020-08-25 PROCEDURE — 99255 IP/OBS CONSLTJ NEW/EST HI 80: CPT | Mod: ,,, | Performed by: INTERNAL MEDICINE

## 2020-08-25 PROCEDURE — 11000001 HC ACUTE MED/SURG PRIVATE ROOM

## 2020-08-25 PROCEDURE — 36415 COLL VENOUS BLD VENIPUNCTURE: CPT

## 2020-08-25 PROCEDURE — 25000003 PHARM REV CODE 250: Performed by: EMERGENCY MEDICINE

## 2020-08-25 PROCEDURE — 93005 ELECTROCARDIOGRAM TRACING: CPT

## 2020-08-25 PROCEDURE — 99291 CRITICAL CARE FIRST HOUR: CPT | Mod: 25

## 2020-08-25 PROCEDURE — 83880 ASSAY OF NATRIURETIC PEPTIDE: CPT

## 2020-08-25 PROCEDURE — 85610 PROTHROMBIN TIME: CPT

## 2020-08-25 PROCEDURE — 63600175 PHARM REV CODE 636 W HCPCS: Performed by: EMERGENCY MEDICINE

## 2020-08-25 PROCEDURE — 96360 HYDRATION IV INFUSION INIT: CPT

## 2020-08-25 PROCEDURE — 85379 FIBRIN DEGRADATION QUANT: CPT

## 2020-08-25 PROCEDURE — 85025 COMPLETE CBC W/AUTO DIFF WBC: CPT

## 2020-08-25 PROCEDURE — 85730 THROMBOPLASTIN TIME PARTIAL: CPT

## 2020-08-25 PROCEDURE — 84484 ASSAY OF TROPONIN QUANT: CPT

## 2020-08-25 PROCEDURE — 93010 ELECTROCARDIOGRAM REPORT: CPT | Mod: ,,, | Performed by: INTERNAL MEDICINE

## 2020-08-25 PROCEDURE — U0002 COVID-19 LAB TEST NON-CDC: HCPCS

## 2020-08-25 PROCEDURE — 99255 PR INITIAL INPATIENT CONSULT,LEVL V: ICD-10-PCS | Mod: ,,, | Performed by: INTERNAL MEDICINE

## 2020-08-25 PROCEDURE — 25500020 PHARM REV CODE 255: Performed by: EMERGENCY MEDICINE

## 2020-08-25 PROCEDURE — 80053 COMPREHEN METABOLIC PANEL: CPT

## 2020-08-25 PROCEDURE — 21400001 HC TELEMETRY ROOM

## 2020-08-25 PROCEDURE — 96361 HYDRATE IV INFUSION ADD-ON: CPT

## 2020-08-25 PROCEDURE — 93010 EKG 12-LEAD: ICD-10-PCS | Mod: ,,, | Performed by: INTERNAL MEDICINE

## 2020-08-25 RX ORDER — ACETAMINOPHEN 325 MG/1
650 TABLET ORAL EVERY 8 HOURS PRN
Status: DISCONTINUED | OUTPATIENT
Start: 2020-08-25 | End: 2020-08-27 | Stop reason: HOSPADM

## 2020-08-25 RX ORDER — HYDROCODONE BITARTRATE AND ACETAMINOPHEN 5; 325 MG/1; MG/1
1 TABLET ORAL EVERY 4 HOURS PRN
Status: DISCONTINUED | OUTPATIENT
Start: 2020-08-25 | End: 2020-08-27 | Stop reason: HOSPADM

## 2020-08-25 RX ORDER — SODIUM CHLORIDE 0.9 % (FLUSH) 0.9 %
10 SYRINGE (ML) INJECTION
Status: DISCONTINUED | OUTPATIENT
Start: 2020-08-25 | End: 2020-08-27 | Stop reason: HOSPADM

## 2020-08-25 RX ORDER — TALC
10 POWDER (GRAM) TOPICAL NIGHTLY
Status: DISCONTINUED | OUTPATIENT
Start: 2020-08-25 | End: 2020-08-27 | Stop reason: HOSPADM

## 2020-08-25 RX ORDER — ONDANSETRON 2 MG/ML
4 INJECTION INTRAMUSCULAR; INTRAVENOUS EVERY 8 HOURS PRN
Status: DISCONTINUED | OUTPATIENT
Start: 2020-08-25 | End: 2020-08-27 | Stop reason: HOSPADM

## 2020-08-25 RX ORDER — HEPARIN SODIUM,PORCINE/D5W 25000/250
18 INTRAVENOUS SOLUTION INTRAVENOUS CONTINUOUS
Status: DISCONTINUED | OUTPATIENT
Start: 2020-08-25 | End: 2020-08-27 | Stop reason: HOSPADM

## 2020-08-25 RX ADMIN — HEPARIN SODIUM 18 UNITS/KG/HR: 5000 INJECTION INTRAVENOUS; SUBCUTANEOUS at 05:08

## 2020-08-25 RX ADMIN — ACETAMINOPHEN 650 MG: 325 TABLET ORAL at 10:08

## 2020-08-25 RX ADMIN — IOHEXOL 100 ML: 350 INJECTION, SOLUTION INTRAVENOUS at 12:08

## 2020-08-25 RX ADMIN — SODIUM CHLORIDE 500 ML: 0.9 INJECTION, SOLUTION INTRAVENOUS at 09:08

## 2020-08-25 NOTE — SUBJECTIVE & OBJECTIVE
Past Medical History:   Diagnosis Date    PSVT (paroxysmal supraventricular tachycardia) 3/1/2017    Supraventricular tachycardia        Past Surgical History:   Procedure Laterality Date    WISDOM TOOTH EXTRACTION         Review of patient's allergies indicates:  No Known Allergies    No current facility-administered medications on file prior to encounter.      Current Outpatient Medications on File Prior to Encounter   Medication Sig    cetirizine (ZYRTEC) 10 MG tablet Take 10 mg by mouth once daily.    levonorgestrel-ethinyl estradiol (SEASONALE) 0.15 mg-30 mcg per tablet TAKE 1 TABLET BY MOUTH ONCE DAILY.    melatonin 10 mg Tab Take 10 mg by mouth every evening.     Family History     Problem Relation (Age of Onset)    Atrial fibrillation Paternal Grandfather    Colon cancer Paternal Aunt    Mitral valve prolapse Mother        Tobacco Use    Smoking status: Never Smoker    Smokeless tobacco: Never Used   Substance and Sexual Activity    Alcohol use: Yes     Alcohol/week: 0.0 standard drinks     Comment: seldom    Drug use: No    Sexual activity: Yes     Partners: Male     Birth control/protection: OCP     Comment: mut monog     Review of Systems   Constitutional: Negative.    HENT: Negative.    Eyes: Negative.    Respiratory: Negative.         Hemoptysis    Cardiovascular: Positive for chest pain.   Gastrointestinal: Negative.    Endocrine: Negative.    Genitourinary: Negative.    Musculoskeletal: Negative.    Allergic/Immunologic: Negative.    Neurological: Negative.    Hematological: Negative.    Psychiatric/Behavioral: Negative.      Objective:     Vital Signs (Most Recent):  Temp: 97.9 °F (36.6 °C) (08/25/20 1720)  Pulse: 98 (08/25/20 1720)  Resp: 16 (08/25/20 1720)  BP: 133/77 (08/25/20 1720)  SpO2: 97 % (08/25/20 1720) Vital Signs (24h Range):  Temp:  [97.9 °F (36.6 °C)-98.7 °F (37.1 °C)] 97.9 °F (36.6 °C)  Pulse:  [] 98  Resp:  [12-21] 16  SpO2:  [97 %-100 %] 97 %  BP: (113-135)/(56-79)  133/77     Weight: 72.1 kg (158 lb 15.2 oz)  Body mass index is 24.9 kg/m².    Physical Exam  Constitutional:       General: She is not in acute distress.     Appearance: Normal appearance. She is not ill-appearing.   HENT:      Head: Normocephalic and atraumatic.      Nose: Nose normal. No congestion or rhinorrhea.      Mouth/Throat:      Mouth: Mucous membranes are moist.   Eyes:      Extraocular Movements: Extraocular movements intact.      Pupils: Pupils are equal, round, and reactive to light.   Neck:      Musculoskeletal: Normal range of motion and neck supple. No neck rigidity or muscular tenderness.   Cardiovascular:      Rate and Rhythm: Regular rhythm.      Pulses: Normal pulses.      Heart sounds: Normal heart sounds. No murmur.   Pulmonary:      Effort: Pulmonary effort is normal. No respiratory distress.      Breath sounds: No stridor. No wheezing or rhonchi.   Abdominal:      General: Abdomen is flat. There is no distension.      Palpations: Abdomen is soft. There is no mass.      Tenderness: There is no abdominal tenderness.      Hernia: No hernia is present.   Musculoskeletal: Normal range of motion.         General: No swelling, tenderness, deformity or signs of injury.   Skin:     General: Skin is warm.      Coloration: Skin is not jaundiced or pale.   Neurological:      General: No focal deficit present.      Mental Status: She is alert and oriented to person, place, and time.      Cranial Nerves: No cranial nerve deficit.      Sensory: No sensory deficit.   Psychiatric:         Mood and Affect: Mood normal.         Behavior: Behavior normal.           CRANIAL NERVES     CN III, IV, VI   Pupils are equal, round, and reactive to light.       Significant Labs: All pertinent labs within the past 24 hours have been reviewed.    Significant Imaging: I have reviewed all pertinent imaging results/findings within the past 24 hours.

## 2020-08-25 NOTE — ASSESSMENT & PLAN NOTE
H/O sagittal sinus thrombosis s/p coumadin   Hematology consulted   Started on heparin  No sign of right heart strain   Most likely will need life long  OAC

## 2020-08-25 NOTE — H&P
[facility] Hospital Medicine  History & Physical    Patient Name: Jo-Ann Ochoa  MRN: [id number]  Admission Date: 8/25/2020  Attending Physician: Gato Espinoza MD  Primary Care Provider: Diomedes Starks MD         Patient information was obtained from patient and ER records.     Subjective:     Principal Problem:<principal problem not specified>    Chief Complaint:   Chief Complaint   Patient presents with    Shortness of Breath     pt c/o shortness with exertion and reports coughing up blood this morning, pt is 11wk pregnant        HPI:   36 y/o wf with a PMHx of superior sagittal sinus thrombus s/p coumadin x 3 months  And   11 week pregnancy presented to the ER with a C/O  Hemoptysis  Since yesterday . She report  Had one episode of hemoptysis yesterday  . It is associated with a chest pain which worse with inspiration ( started today ). She denies  Any fever , chills , sob  , recent travel , LE swelling , abdominal pain , nausea , vomit  , diarrhea or  sx .  She was in her usual state of health before yesterday .   ER COURSE :  D-dimer 2 , CXR show no acute finding , B/L LE US  Negative for DVT  , CTA Chest show B/L PE .The ER Doctor Discussed the case with hematology which recommend heparin drip . The case was discussed with IR which did not recommend  EKKOs at this time   ER VS  Initial Vitals [08/25/20 0852]   BP Pulse Resp Temp SpO2   135/79 (!) 114 20 98.7 °F (37.1 °C) 97 %         Pt will be admitted  To inpatient with a Dx of acute B/L PE  And First trimester pregnancy       Past Medical History:   Diagnosis Date    PSVT (paroxysmal supraventricular tachycardia) 3/1/2017    Supraventricular tachycardia        Past Surgical History:   Procedure Laterality Date    WISDOM TOOTH EXTRACTION         Review of patient's allergies indicates:  No Known Allergies    No current facility-administered medications on file prior to encounter.      Current Outpatient Medications on  File Prior to Encounter   Medication Sig    cetirizine (ZYRTEC) 10 MG tablet Take 10 mg by mouth once daily.    levonorgestrel-ethinyl estradiol (SEASONALE) 0.15 mg-30 mcg per tablet TAKE 1 TABLET BY MOUTH ONCE DAILY.    melatonin 10 mg Tab Take 10 mg by mouth every evening.     Family History     Problem Relation (Age of Onset)    Atrial fibrillation Paternal Grandfather    Colon cancer Paternal Aunt    Mitral valve prolapse Mother        Tobacco Use    Smoking status: Never Smoker    Smokeless tobacco: Never Used   Substance and Sexual Activity    Alcohol use: Yes     Alcohol/week: 0.0 standard drinks     Comment: seldom    Drug use: No    Sexual activity: Yes     Partners: Male     Birth control/protection: OCP     Comment: mut monog     Review of Systems   Constitutional: Negative.    HENT: Negative.    Eyes: Negative.    Respiratory: Negative.         Hemoptysis    Cardiovascular: Positive for chest pain.   Gastrointestinal: Negative.    Endocrine: Negative.    Genitourinary: Negative.    Musculoskeletal: Negative.    Allergic/Immunologic: Negative.    Neurological: Negative.    Hematological: Negative.    Psychiatric/Behavioral: Negative.      Objective:     Vital Signs (Most Recent):  Temp: 97.9 °F (36.6 °C) (08/25/20 1720)  Pulse: 98 (08/25/20 1720)  Resp: 16 (08/25/20 1720)  BP: 133/77 (08/25/20 1720)  SpO2: 97 % (08/25/20 1720) Vital Signs (24h Range):  Temp:  [97.9 °F (36.6 °C)-98.7 °F (37.1 °C)] 97.9 °F (36.6 °C)  Pulse:  [] 98  Resp:  [12-21] 16  SpO2:  [97 %-100 %] 97 %  BP: (113-135)/(56-79) 133/77     Weight: 72.1 kg (158 lb 15.2 oz)  Body mass index is 24.9 kg/m².    Physical Exam  Constitutional:       General: She is not in acute distress.     Appearance: Normal appearance. She is not ill-appearing.   HENT:      Head: Normocephalic and atraumatic.      Nose: Nose normal. No congestion or rhinorrhea.      Mouth/Throat:      Mouth: Mucous membranes are moist.   Eyes:      Extraocular  Movements: Extraocular movements intact.      Pupils: Pupils are equal, round, and reactive to light.   Neck:      Musculoskeletal: Normal range of motion and neck supple. No neck rigidity or muscular tenderness.   Cardiovascular:      Rate and Rhythm: Regular rhythm.      Pulses: Normal pulses.      Heart sounds: Normal heart sounds. No murmur.   Pulmonary:      Effort: Pulmonary effort is normal. No respiratory distress.      Breath sounds: No stridor. No wheezing or rhonchi.   Abdominal:      General: Abdomen is flat. There is no distension.      Palpations: Abdomen is soft. There is no mass.      Tenderness: There is no abdominal tenderness.      Hernia: No hernia is present.   Musculoskeletal: Normal range of motion.         General: No swelling, tenderness, deformity or signs of injury.   Skin:     General: Skin is warm.      Coloration: Skin is not jaundiced or pale.   Neurological:      General: No focal deficit present.      Mental Status: She is alert and oriented to person, place, and time.      Cranial Nerves: No cranial nerve deficit.      Sensory: No sensory deficit.   Psychiatric:         Mood and Affect: Mood normal.         Behavior: Behavior normal.           CRANIAL NERVES     CN III, IV, VI   Pupils are equal, round, and reactive to light.       Significant Labs: All pertinent labs within the past 24 hours have been reviewed.    Significant Imaging: I have reviewed all pertinent imaging results/findings within the past 24 hours.    Assessment/Plan:     First trimester pregnancy  Per OB/GYN       Bilateral pulmonary embolism  H/O sagittal sinus thrombosis s/p coumadin   Hematology consulted   Started on heparin  No sign of right heart strain   Most likely will need life long  OAC            VTE Risk Mitigation (From admission, onward)         Ordered     heparin 25,000 units in dextrose 5% 250 mL (100 units/mL) infusion HIGH INTENSITY nomogram - [facility]  Continuous     Question:  Heparin Infusion  Adjustment (DO NOT MODIFY ANSWER)  Answer:  [email or web address]\epic\Images\Pharmacy\HeparinInfusions\heparin HIGH INTENSITY nomogram for [facility] [id number]    08/25/20 1259     heparin 25,000 units in dextrose 5% (100 units/ml) IV bolus from bag - ADDITIONAL PRN BOLUS - 60 units/kg  As needed (PRN)     Question:  Heparin Infusion Adjustment (DO NOT MODIFY ANSWER)  Answer:  [email or web address]\epic\Images\Pharmacy\HeparinInfusions\heparin HIGH INTENSITY nomogram for [facility] [id number]    08/25/20 1259     heparin 25,000 units in dextrose 5% (100 units/ml) IV bolus from bag - ADDITIONAL PRN BOLUS - 30 units/kg  As needed (PRN)     Question:  Heparin Infusion Adjustment (DO NOT MODIFY ANSWER)  Answer:  [email or web address]\epic\Images\Pharmacy\HeparinInfusions\heparin HIGH INTENSITY nomogram for [facility] [id number].pdf    08/25/20 1259                   Gato Espinoza MD  Department of Hospital Medicine   [facility]

## 2020-08-25 NOTE — HPI
35-year-old female 11 weeks pregnant patient presented with hemoptysis found to have bilateral pulmonary emboli.  Patient in 2017 had cerebral sinus thrombosis treated with heparin and warfarin 3 months after pregnancy discontinued.  Did not recommend any further thrombosis prevention.  Patient did have hypercoagulable workup reviewed in Care everywhere which was negative.  Patient now presents with bilateral pulmonary emboli hemoptysis was asked to see patient for further evaluation

## 2020-08-25 NOTE — ED PROVIDER NOTES
SCRIBE #1 NOTE: I, Taylor Bell, am scribing for, and in the presence of, Balta Jamil Jr., MD. I have scribed the entire note.       History     Chief Complaint   Patient presents with    Shortness of Breath     pt c/o shortness with exertion and reports coughing up blood this morning, pt is 11wk pregnant     Review of patient's allergies indicates:  No Known Allergies      History of Present Illness     HPI    8/25/2020, 9:05 AM  History obtained from the patient      History of Present Illness: Jo-Ann Ochoa is a 35 y.o. 11 wk gravid female patient with a PMHx of PSVT, Supraventricular tachycardia, and DVT prior to previous pregnancy 3 years ago, who presents to the Emergency Department for evaluation of dyspnea on exertion which onset gradually this morning. Symptoms are intermittent and moderate in severity. No mitigating or exacerbating factors reported. Associated sxs include mild hemoptysis and congestion. Patient denies any leg swelling, leg pain, fever, chills, CP, n/v/d, vaginal bleeding, and all other sxs at this time. Prior Tx includes none. No anticoagulant use. No further complaints or concerns at this time.       Arrival mode: Personal vehicle    PCP: Diomedes Starks MD        Past Medical History:  Past Medical History:   Diagnosis Date    PSVT (paroxysmal supraventricular tachycardia) 3/1/2017    Supraventricular tachycardia        Past Surgical History:  Past Surgical History:   Procedure Laterality Date    WISDOM TOOTH EXTRACTION           Family History:  Family History   Problem Relation Age of Onset    Mitral valve prolapse Mother     Colon cancer Paternal Aunt         great aunt    Atrial fibrillation Paternal Grandfather     Breast cancer Neg Hx     Ovarian cancer Neg Hx     Uterine cancer Neg Hx     Cervical cancer Neg Hx        Social History:  Social History     Tobacco Use    Smoking status: Never Smoker    Smokeless tobacco: Never Used   Substance and Sexual  Activity    Alcohol use: Yes     Alcohol/week: 0.0 standard drinks     Comment: seldom    Drug use: No    Sexual activity: Yes     Partners: Male     Birth control/protection: OCP     Comment: mut monog        Review of Systems     Review of Systems   Constitutional: Negative for chills and fever.   HENT: Positive for congestion. Negative for sore throat.    Respiratory: Positive for shortness of breath (on exertion).         (+) mild hemoptysis   Cardiovascular: Negative for chest pain and leg swelling.   Gastrointestinal: Negative for abdominal pain, diarrhea, nausea and vomiting.   Genitourinary: Negative for dysuria and vaginal bleeding.   Musculoskeletal: Negative for back pain and neck pain.        (-) leg pain   Skin: Negative for rash.   Neurological: Negative for weakness.   Hematological: Does not bruise/bleed easily.   All other systems reviewed and are negative.     Physical Exam     Initial Vitals [08/25/20 0852]   BP Pulse Resp Temp SpO2   135/79 (!) 114 20 98.7 °F (37.1 °C) 97 %      MAP       --          Physical Exam  Nursing Notes and Vital Signs Reviewed.  Constitutional: Patient is in no acute distress. Well-developed and well-nourished.  Head: Atraumatic. Normocephalic.  Eyes: PERRL. EOM intact. Conjunctivae are not pale. No scleral icterus.  ENT: Mucous membranes are moist. Oropharynx is clear and symmetric.    Neck: Supple. Full ROM. No lymphadenopathy.  Cardiovascular: Regular rate. Regular rhythm. No murmurs, rubs, or gallops. Distal pulses are 2+ and symmetric.  Pulmonary/Chest: No respiratory distress. Clear to auscultation bilaterally. No wheezing or rales.  Abdominal: Soft and non-distended.  There is no tenderness.  No rebound, guarding, or rigidity.  Musculoskeletal: Moves all extremities. No obvious deformities. No edema. No calf tenderness.  Skin: Warm and dry.  Neurological:  Alert, awake, and appropriate.  Normal speech.  No acute focal neurological deficits are  "appreciated.  Psychiatric: Normal affect. Good eye contact. Appropriate in content.     ED Course   Critical Care    Date/Time: 8/25/2020 1:00 PM  Performed by: Balta Jamil Jr., MD  Authorized by: Balta Jamil Jr., MD   Direct patient critical care time: 10 minutes  Additional history critical care time: 11 minutes  Ordering / reviewing critical care time: 11 minutes  Documentation critical care time: 5 minutes  Consulting other physicians critical care time: 8 minutes  Total critical care time (exclusive of procedural time) : 45 minutes  Critical care time was exclusive of separately billable procedures and treating other patients and teaching time.  Critical care was necessary to treat or prevent imminent or life-threatening deterioration of the following conditions: bilateral PE.  Critical care was time spent personally by me on the following activities: blood draw for specimens, discussions with consultants, development of treatment plan with patient or surrogate, interpretation of cardiac output measurements, evaluation of patient's response to treatment, examination of patient, obtaining history from patient or surrogate, ordering and performing treatments and interventions, ordering and review of laboratory studies, ordering and review of radiographic studies, pulse oximetry, re-evaluation of patient's condition and review of old charts.        ED Vital Signs:  Vitals:    08/25/20 0852   BP: 135/79   Pulse: (!) 114   Resp: 20   Temp: 98.7 °F (37.1 °C)   TempSrc: Oral   SpO2: 97%   Weight: 72.1 kg (158 lb 15.2 oz)   Height: 5' 7" (1.702 m)       Abnormal Lab Results:  Labs Reviewed   CBC W/ AUTO DIFFERENTIAL   COMPREHENSIVE METABOLIC PANEL   TROPONIN I   B-TYPE NATRIURETIC PEPTIDE   D DIMER, QUANTITATIVE        All Lab Results:  Results for orders placed or performed during the hospital encounter of 08/25/20   CBC auto differential   Result Value Ref Range    WBC 10.26 3.90 - 12.70 K/uL    RBC 4.85 " 4.00 - 5.40 M/uL    Hemoglobin 15.2 12.0 - 16.0 g/dL    Hematocrit 45.5 37.0 - 48.5 %    Mean Corpuscular Volume 94 82 - 98 fL    Mean Corpuscular Hemoglobin 31.3 (H) 27.0 - 31.0 pg    Mean Corpuscular Hemoglobin Conc 33.4 32.0 - 36.0 g/dL    RDW 13.1 11.5 - 14.5 %    Platelets 179 150 - 350 K/uL    MPV 10.6 9.2 - 12.9 fL    Immature Granulocytes 0.5 0.0 - 0.5 %    Gran # (ANC) 8.2 (H) 1.8 - 7.7 K/uL    Immature Grans (Abs) 0.05 (H) 0.00 - 0.04 K/uL    Lymph # 1.5 1.0 - 4.8 K/uL    Mono # 0.4 0.3 - 1.0 K/uL    Eos # 0.1 0.0 - 0.5 K/uL    Baso # 0.02 0.00 - 0.20 K/uL    nRBC 0 0 /100 WBC    Gran% 80.3 (H) 38.0 - 73.0 %    Lymph% 14.2 (L) 18.0 - 48.0 %    Mono% 4.0 4.0 - 15.0 %    Eosinophil% 0.8 0.0 - 8.0 %    Basophil% 0.2 0.0 - 1.9 %    Differential Method Automated    Comprehensive metabolic panel   Result Value Ref Range    Sodium 136 136 - 145 mmol/L    Potassium 3.8 3.5 - 5.1 mmol/L    Chloride 104 95 - 110 mmol/L    CO2 19 (L) 23 - 29 mmol/L    Glucose 84 70 - 110 mg/dL    BUN, Bld 9 6 - 20 mg/dL    Creatinine 0.7 0.5 - 1.4 mg/dL    Calcium 9.0 8.7 - 10.5 mg/dL    Total Protein 7.3 6.0 - 8.4 g/dL    Albumin 3.3 (L) 3.5 - 5.2 g/dL    Total Bilirubin 0.7 0.1 - 1.0 mg/dL    Alkaline Phosphatase 85 55 - 135 U/L    AST 18 10 - 40 U/L    ALT 12 10 - 44 U/L    Anion Gap 13 8 - 16 mmol/L    eGFR if African American >60 >60 mL/min/1.73 m^2    eGFR if non African American >60 >60 mL/min/1.73 m^2   Troponin I   Result Value Ref Range    Troponin I <0.006 0.000 - 0.026 ng/mL   Brain natriuretic peptide   Result Value Ref Range    BNP 41 0 - 99 pg/mL   D dimer, quantitative   Result Value Ref Range    D-Dimer 2.10 (H) <0.50 mg/L FEU   COVID-19 Rapid Screening   Result Value Ref Range    SARS-CoV-2 RNA, Amplification, Qual Negative Negative         Imaging Results:  Imaging Results    None          The EKG was ordered, reviewed, and independently interpreted by the ED provider.  Interpretation time: 0911  Rate: 103  BPM  Rhythm: sinus tachycardia and sinus bradycardia  Interpretation: Possible L atrial enlargement. Left posterior fascicular block. No STEMI.           The Emergency Provider reviewed the vital signs and test results, which are outlined above.     ED Discussion       10:28 AM: Discussed pt's case with Dr. Robledo (Obstetrics and Gynecology) who endorses obtaining either a CT scan or V/Q for PE concerns. I will order a CTA chest - non coronary.    12:51 PM: I notified Dr. Robledo (obstetrics and gynecology) that pt is positive for small peripheral bilateral lower lobe PE. She recommends Hem/Onc consult, initiation of anticoagulants, and admission to hospital medicine. I will begin heparin drip and notify Dr. Branham (hematology and oncology) of the pt's case.    12:59 PM: Re-evaluated pt. I have discussed test results, shared treatment plan, and the need for admission with patient at bedside. Pt expresses understanding at this time and agree with all information. All questions answered. Pt has no further questions or concerns at this time. Pt is ready for admit.    1:01 PM: Discussed case with Mandy Obrien NP (Hospital Medicine). Dr. Crane (Internal Medicine) agrees with current care and management of pt and accepts admission.   Admitting Service: Hospital Medicine  Admitting Physician: Dr. Crane  Admit to: Inpatient          Medical Decision Making:   Clinical Tests:   Lab Tests: Ordered and Reviewed  Radiological Study: Ordered and Reviewed  Medical Tests: Ordered and Reviewed           ED Medication(s):  Medications   sodium chloride 0.9% bolus 500 mL (has no administration in time range)       New Prescriptions    No medications on file               Scribe Attestation:   Scribe #1: I performed the above scribed service and the documentation accurately describes the services I performed. I attest to the accuracy of the note.     Attending:   Physician Attestation Statement for Scribe #1: Balta ARREDONDO  Loulou Campo MD, personally performed the services described in this documentation, as scribed by Taylor Bell, in my presence, and it is both accurate and complete.           Clinical Impression       ICD-10-CM ICD-9-CM   1. Dyspnea  R06.00 786.09   2. Hemoptysis  R04.2 786.30       Disposition:   Disposition: Admitted  Condition: Caio Jamil Jr., MD  08/25/20 1307

## 2020-08-25 NOTE — PLAN OF CARE
Problem: Adult Inpatient Plan of Care  Goal: Patient-Specific Goal (Individualization)  Outcome: Ongoing, Progressing     Problem: Adult Inpatient Plan of Care  Goal: Absence of Hospital-Acquired Illness or Injury  Outcome: Ongoing, Progressing

## 2020-08-25 NOTE — SUBJECTIVE & OBJECTIVE
Oncology Treatment Plan:   [No treatment plan]    Medications:  Continuous Infusions:   heparin (porcine) in D5W       Scheduled Meds:   heparin (PORCINE)  80 Units/kg (Adjusted) Intravenous Once     PRN Meds:heparin (PORCINE), heparin (PORCINE)     Review of patient's allergies indicates:  No Known Allergies     Past Medical History:   Diagnosis Date    PSVT (paroxysmal supraventricular tachycardia) 3/1/2017    Supraventricular tachycardia      Past Surgical History:   Procedure Laterality Date    WISDOM TOOTH EXTRACTION       Family History     Problem Relation (Age of Onset)    Atrial fibrillation Paternal Grandfather    Colon cancer Paternal Aunt    Mitral valve prolapse Mother        Tobacco Use    Smoking status: Never Smoker    Smokeless tobacco: Never Used   Substance and Sexual Activity    Alcohol use: Yes     Alcohol/week: 0.0 standard drinks     Comment: seldom    Drug use: No    Sexual activity: Yes     Partners: Male     Birth control/protection: OCP     Comment: mut monog       Review of Systems   Constitutional: Positive for fatigue. Negative for activity change, appetite change, chills, diaphoresis, fever and unexpected weight change.   HENT: Negative for congestion, dental problem, drooling, ear discharge, ear pain, facial swelling, hearing loss, mouth sores, nosebleeds, postnasal drip, rhinorrhea, sinus pressure, sneezing, sore throat, tinnitus, trouble swallowing and voice change.    Eyes: Negative for photophobia, pain, discharge, redness, itching and visual disturbance.   Respiratory: Positive for chest tightness and shortness of breath. Negative for cough, choking, wheezing and stridor.    Cardiovascular: Positive for chest pain. Negative for palpitations and leg swelling.   Gastrointestinal: Negative for abdominal distention, abdominal pain, anal bleeding, blood in stool, constipation, diarrhea, nausea, rectal pain and vomiting.   Endocrine: Negative for cold intolerance, heat  intolerance, polydipsia, polyphagia and polyuria.   Genitourinary: Negative for decreased urine volume, difficulty urinating, dyspareunia, dysuria, enuresis, flank pain, frequency, genital sores, hematuria, menstrual problem, pelvic pain, urgency, vaginal bleeding, vaginal discharge and vaginal pain.   Musculoskeletal: Negative for arthralgias, back pain, gait problem, joint swelling, myalgias, neck pain and neck stiffness.   Skin: Negative for color change, pallor and rash.   Allergic/Immunologic: Negative for environmental allergies, food allergies and immunocompromised state.   Neurological: Positive for weakness. Negative for dizziness, tremors, seizures, syncope, facial asymmetry, speech difficulty, light-headedness, numbness and headaches.   Hematological: Negative for adenopathy. Does not bruise/bleed easily.   Psychiatric/Behavioral: Positive for dysphoric mood. Negative for agitation, behavioral problems, confusion, decreased concentration, hallucinations, self-injury, sleep disturbance and suicidal ideas. The patient is nervous/anxious. The patient is not hyperactive.      Objective:     Vital Signs (Most Recent):  Temp: 98.2 °F (36.8 °C) (08/25/20 1429)  Pulse: 98 (08/25/20 1429)  Resp: 18 (08/25/20 1429)  BP: 131/60 (08/25/20 1429)  SpO2: 99 % (08/25/20 1429) Vital Signs (24h Range):  Temp:  [98.2 °F (36.8 °C)-98.7 °F (37.1 °C)] 98.2 °F (36.8 °C)  Pulse:  [] 98  Resp:  [12-21] 18  SpO2:  [97 %-100 %] 99 %  BP: (113-135)/(56-79) 131/60     Weight: 72.1 kg (158 lb 15.2 oz)  Body mass index is 24.9 kg/m².  Body surface area is 1.85 meters squared.    No intake or output data in the 24 hours ending 08/25/20 1712    Physical Exam  Vitals signs reviewed.   Constitutional:       General: She is not in acute distress.     Appearance: She is well-developed. She is ill-appearing. She is not diaphoretic.   HENT:      Head: Normocephalic and atraumatic.      Right Ear: External ear normal.      Left Ear:  External ear normal.      Nose: Nose normal.      Right Sinus: No maxillary sinus tenderness or frontal sinus tenderness.      Left Sinus: No maxillary sinus tenderness or frontal sinus tenderness.      Mouth/Throat:      Pharynx: No oropharyngeal exudate.   Eyes:      General: Lids are normal. No scleral icterus.        Right eye: No discharge.         Left eye: No discharge.      Conjunctiva/sclera: Conjunctivae normal.      Right eye: Right conjunctiva is not injected. No hemorrhage.     Left eye: Left conjunctiva is not injected. No hemorrhage.     Pupils: Pupils are equal, round, and reactive to light.   Neck:      Musculoskeletal: Normal range of motion and neck supple.      Thyroid: No thyromegaly.      Vascular: No JVD.      Trachea: No tracheal deviation.   Cardiovascular:      Rate and Rhythm: Normal rate and regular rhythm.      Heart sounds: Normal heart sounds.   Pulmonary:      Effort: Pulmonary effort is normal. No respiratory distress.      Breath sounds: Normal breath sounds. No stridor.   Chest:      Chest wall: No tenderness.   Abdominal:      General: Bowel sounds are normal. There is no distension.      Palpations: Abdomen is soft. There is no hepatomegaly, splenomegaly or mass.      Tenderness: There is no abdominal tenderness. There is no rebound.   Musculoskeletal: Normal range of motion.         General: No tenderness.   Lymphadenopathy:      Cervical: No cervical adenopathy.      Upper Body:      Right upper body: No supraclavicular adenopathy.      Left upper body: No supraclavicular adenopathy.   Skin:     General: Skin is dry.      Findings: No erythema or rash.   Neurological:      Mental Status: She is alert and oriented to person, place, and time.      Cranial Nerves: No cranial nerve deficit.      Coordination: Coordination normal.   Psychiatric:         Behavior: Behavior normal.         Thought Content: Thought content normal.         Judgment: Judgment normal.         Significant  Labs:   BMP:   Recent Labs   Lab 08/25/20 0915   GLU 84      K 3.8      CO2 19*   BUN 9   CREATININE 0.7   CALCIUM 9.0   , CBC:   Recent Labs   Lab 08/25/20 0915 08/25/20  1315   WBC 10.26 12.26   HGB 15.2 14.2   HCT 45.5 41.9    179   , CMP:   Recent Labs   Lab 08/25/20 0915      K 3.8      CO2 19*   GLU 84   BUN 9   CREATININE 0.7   CALCIUM 9.0   PROT 7.3   ALBUMIN 3.3*   BILITOT 0.7   ALKPHOS 85   AST 18   ALT 12   ANIONGAP 13   EGFRNONAA >60   , Coagulation:   Recent Labs   Lab 08/25/20 0923   INR 0.9   APTT 27.5   , Haptoglobin: No results for input(s): HAPTOGLOBIN in the last 48 hours., Immunology: No results for input(s): SPEP, DAGMAR, SUNDAR, FREELAMBDALI in the last 48 hours., LDH: No results for input(s): LDHCSF, BFSOURCE in the last 48 hours., LFTs:   Recent Labs   Lab 08/25/20 0915   ALT 12   AST 18   ALKPHOS 85   BILITOT 0.7   PROT 7.3   ALBUMIN 3.3*   , Reticulocytes: No results for input(s): RETIC in the last 48 hours., Tumor Markers: No results for input(s): PSA, CEA, , AFPTM, [id number],  in the last 48 hours.    Invalid input(s): ALGTM, Uric Acid No results for input(s): URICACID in the last 48 hours. and Urine Studies: No results for input(s): COLORU, APPEARANCEUA, PHUR, SPECGRAV, PROTEINUA, GLUCUA, KETONESU, BILIRUBINUA, OCCULTUA, NITRITE, UROBILINOGEN, LEUKOCYTESUR, RBCUA, WBCUA, BACTERIA, SQUAMEPITHEL, HYALINECASTS in the last 48 hours.    Invalid input(s): WRIGHTSUR    Diagnostic Results:  I have reviewed all pertinent imaging results/findings within the past 24 hours.

## 2020-08-25 NOTE — CONSULTS
[facility] -   Hematology/Oncology  Consult Note    Patient Name: Jo-Ann Ochoa  MRN: [id number]  Admission Date: 8/25/2020  Hospital Length of Stay: 0 days  Code Status: No Order   Attending Provider: Gato Espinoza, *  Consulting Provider: Chad Branham MD  Primary Care Physician: Diomedes Starks MD  Principal Problem:<principal problem not specified>    Consults  Subjective:     HPI:  35-year-old female 11 weeks pregnant patient presented with hemoptysis found to have bilateral pulmonary emboli.  Patient in 2017 had cerebral sinus thrombosis treated with heparin and warfarin 3 months after pregnancy discontinued.  Did not recommend any further thrombosis prevention.  Patient did have hypercoagulable workup reviewed in Care everywhere which was negative.  Patient now presents with bilateral pulmonary emboli hemoptysis was asked to see patient for further evaluation    Oncology Treatment Plan:   [No treatment plan]    Medications:  Continuous Infusions:   heparin (porcine) in D5W       Scheduled Meds:   heparin (PORCINE)  80 Units/kg (Adjusted) Intravenous Once     PRN Meds:heparin (PORCINE), heparin (PORCINE)     Review of patient's allergies indicates:  No Known Allergies     Past Medical History:   Diagnosis Date    PSVT (paroxysmal supraventricular tachycardia) 3/1/2017    Supraventricular tachycardia      Past Surgical History:   Procedure Laterality Date    WISDOM TOOTH EXTRACTION       Family History     Problem Relation (Age of Onset)    Atrial fibrillation Paternal Grandfather    Colon cancer Paternal Aunt    Mitral valve prolapse Mother        Tobacco Use    Smoking status: Never Smoker    Smokeless tobacco: Never Used   Substance and Sexual Activity    Alcohol use: Yes     Alcohol/week: 0.0 standard drinks     Comment: seldom    Drug use: No    Sexual activity: Yes     Partners: Male     Birth control/protection: OCP     Comment: mut monog       Review of Systems    Constitutional: Positive for fatigue. Negative for activity change, appetite change, chills, diaphoresis, fever and unexpected weight change.   HENT: Negative for congestion, dental problem, drooling, ear discharge, ear pain, facial swelling, hearing loss, mouth sores, nosebleeds, postnasal drip, rhinorrhea, sinus pressure, sneezing, sore throat, tinnitus, trouble swallowing and voice change.    Eyes: Negative for photophobia, pain, discharge, redness, itching and visual disturbance.   Respiratory: Positive for chest tightness and shortness of breath. Negative for cough, choking, wheezing and stridor.    Cardiovascular: Positive for chest pain. Negative for palpitations and leg swelling.   Gastrointestinal: Negative for abdominal distention, abdominal pain, anal bleeding, blood in stool, constipation, diarrhea, nausea, rectal pain and vomiting.   Endocrine: Negative for cold intolerance, heat intolerance, polydipsia, polyphagia and polyuria.   Genitourinary: Negative for decreased urine volume, difficulty urinating, dyspareunia, dysuria, enuresis, flank pain, frequency, genital sores, hematuria, menstrual problem, pelvic pain, urgency, vaginal bleeding, vaginal discharge and vaginal pain.   Musculoskeletal: Negative for arthralgias, back pain, gait problem, joint swelling, myalgias, neck pain and neck stiffness.   Skin: Negative for color change, pallor and rash.   Allergic/Immunologic: Negative for environmental allergies, food allergies and immunocompromised state.   Neurological: Positive for weakness. Negative for dizziness, tremors, seizures, syncope, facial asymmetry, speech difficulty, light-headedness, numbness and headaches.   Hematological: Negative for adenopathy. Does not bruise/bleed easily.   Psychiatric/Behavioral: Positive for dysphoric mood. Negative for agitation, behavioral problems, confusion, decreased concentration, hallucinations, self-injury, sleep disturbance and suicidal ideas. The  patient is nervous/anxious. The patient is not hyperactive.      Objective:     Vital Signs (Most Recent):  Temp: 98.2 °F (36.8 °C) (08/25/20 1429)  Pulse: 98 (08/25/20 1429)  Resp: 18 (08/25/20 1429)  BP: 131/60 (08/25/20 1429)  SpO2: 99 % (08/25/20 1429) Vital Signs (24h Range):  Temp:  [98.2 °F (36.8 °C)-98.7 °F (37.1 °C)] 98.2 °F (36.8 °C)  Pulse:  [] 98  Resp:  [12-21] 18  SpO2:  [97 %-100 %] 99 %  BP: (113-135)/(56-79) 131/60     Weight: 72.1 kg (158 lb 15.2 oz)  Body mass index is 24.9 kg/m².  Body surface area is 1.85 meters squared.    No intake or output data in the 24 hours ending 08/25/20 1712    Physical Exam  Vitals signs reviewed.   Constitutional:       General: She is not in acute distress.     Appearance: She is well-developed. She is ill-appearing. She is not diaphoretic.   HENT:      Head: Normocephalic and atraumatic.      Right Ear: External ear normal.      Left Ear: External ear normal.      Nose: Nose normal.      Right Sinus: No maxillary sinus tenderness or frontal sinus tenderness.      Left Sinus: No maxillary sinus tenderness or frontal sinus tenderness.      Mouth/Throat:      Pharynx: No oropharyngeal exudate.   Eyes:      General: Lids are normal. No scleral icterus.        Right eye: No discharge.         Left eye: No discharge.      Conjunctiva/sclera: Conjunctivae normal.      Right eye: Right conjunctiva is not injected. No hemorrhage.     Left eye: Left conjunctiva is not injected. No hemorrhage.     Pupils: Pupils are equal, round, and reactive to light.   Neck:      Musculoskeletal: Normal range of motion and neck supple.      Thyroid: No thyromegaly.      Vascular: No JVD.      Trachea: No tracheal deviation.   Cardiovascular:      Rate and Rhythm: Normal rate and regular rhythm.      Heart sounds: Normal heart sounds.   Pulmonary:      Effort: Pulmonary effort is normal. No respiratory distress.      Breath sounds: Normal breath sounds. No stridor.   Chest:      Chest  wall: No tenderness.   Abdominal:      General: Bowel sounds are normal. There is no distension.      Palpations: Abdomen is soft. There is no hepatomegaly, splenomegaly or mass.      Tenderness: There is no abdominal tenderness. There is no rebound.   Musculoskeletal: Normal range of motion.         General: No tenderness.   Lymphadenopathy:      Cervical: No cervical adenopathy.      Upper Body:      Right upper body: No supraclavicular adenopathy.      Left upper body: No supraclavicular adenopathy.   Skin:     General: Skin is dry.      Findings: No erythema or rash.   Neurological:      Mental Status: She is alert and oriented to person, place, and time.      Cranial Nerves: No cranial nerve deficit.      Coordination: Coordination normal.   Psychiatric:         Behavior: Behavior normal.         Thought Content: Thought content normal.         Judgment: Judgment normal.         Significant Labs:   BMP:   Recent Labs   Lab 08/25/20  0915   GLU 84      K 3.8      CO2 19*   BUN 9   CREATININE 0.7   CALCIUM 9.0   , CBC:   Recent Labs   Lab 08/25/20  0915 08/25/20  1315   WBC 10.26 12.26   HGB 15.2 14.2   HCT 45.5 41.9    179   , CMP:   Recent Labs   Lab 08/25/20  0915      K 3.8      CO2 19*   GLU 84   BUN 9   CREATININE 0.7   CALCIUM 9.0   PROT 7.3   ALBUMIN 3.3*   BILITOT 0.7   ALKPHOS 85   AST 18   ALT 12   ANIONGAP 13   EGFRNONAA >60   , Coagulation:   Recent Labs   Lab 08/25/20  0923   INR 0.9   APTT 27.5   , Haptoglobin: No results for input(s): HAPTOGLOBIN in the last 48 hours., Immunology: No results for input(s): SPEP, DAGMAR, SUNDAR, FREELAMBDALI in the last 48 hours., LDH: No results for input(s): LDHCSF, BFSOURCE in the last 48 hours., LFTs:   Recent Labs   Lab 08/25/20  0915   ALT 12   AST 18   ALKPHOS 85   BILITOT 0.7   PROT 7.3   ALBUMIN 3.3*   , Reticulocytes: No results for input(s): RETIC in the last 48 hours., Tumor Markers: No results for input(s): PSA, CEA, ,  AFPTM, [id number],  in the last 48 hours.    Invalid input(s): ALGTM, Uric Acid No results for input(s): URICACID in the last 48 hours. and Urine Studies: No results for input(s): COLORU, APPEARANCEUA, PHUR, SPECGRAV, PROTEINUA, GLUCUA, KETONESU, BILIRUBINUA, OCCULTUA, NITRITE, UROBILINOGEN, LEUKOCYTESUR, RBCUA, WBCUA, BACTERIA, SQUAMEPITHEL, HYALINECASTS in the last 48 hours.    Invalid input(s): WRIGHTSUR    Diagnostic Results:  I have reviewed all pertinent imaging results/findings within the past 24 hours.    Assessment/Plan:     Bilateral pulmonary embolism  Reviewed imaging personally with  and patient in bed demonstrating bilateral pulmonary emboli.  Reviewed note from Care everywhere.  At this point concur that treatment with intravenous heparin is a reasonable course of action she did present with hemoptysis today using intravenous heparin will allow better control if hemoptysis recurs in terms of discontinuation reversal.  Once patient is medically stabilized will probably convert to low-molecular heparin 1 milligram/kilogram twice a day throughout pregnancy.  She has had 2 thrombotic events I would recommend lifelong anticoagulation at this time.  Once completion of pregnancy discussed implications with her answered questions article from up-to-date followed to her on treatment        Thank you for your consult. I will sign off. Please contact us if you have any additional questions.    Chad Branham MD  Hematology/Oncology  [facility] -

## 2020-08-25 NOTE — HPI
34 y/o wf with a PMHx of superior sagittal sinus thrombus s/p coumadin x 3 months  And   11 week pregnancy presented to the ER with a C/O  Hemoptysis  Since yesterday . She report  Had one episode of hemoptysis yesterday  . It is associated with a chest pain which worse with inspiration ( started today ). She denies  Any fever , chills , sob  , recent travel , LE swelling , abdominal pain , nausea , vomit  , diarrhea or  sx .  She was in her usual state of health before yesterday .   ER COURSE :  D-dimer 2 , CXR show no acute finding , B/L LE US  Negative for DVT  , CTA Chest show B/L PE .The ER Doctor Discussed the case with hematology which recommend heparin drip . The case was discussed with IR which did not recommend  EKKOs at this time   ER VS  Initial Vitals [08/25/20 0852]   BP Pulse Resp Temp SpO2   135/79 (!) 114 20 98.7 °F (37.1 °C) 97 %         Pt will be admitted  To inpatient with a Dx of acute B/L PE  And First trimester pregnancy

## 2020-08-25 NOTE — ED NOTES
Called report to [facility] at this time. Explained that we are waiting on APTT/INR before hanging heparin- nurse understood. Bringing patient up at this time.

## 2020-08-25 NOTE — TELEPHONE ENCOUNTER
"Patient is calling, has been coughing up blood several times, some clots, size of a quarter. She is 10 weeks pregnant, nasal congestion, has hx of sinus thrombosis, ED advised, Verb understanding,     Reason for Disposition   Pregnant or pregnant in past month    Additional Information   Negative: Severe difficulty breathing (e.g., struggling for each breath, speaks in single words)   Negative: [1] Chest pain AND [2] difficulty breathing   Negative: Bluish (or gray) lips or face now   Negative: Passed out (i.e., lost consciousness, collapsed and was not responding)   Negative: Shock suspected (e.g., cold/pale/clammy skin, too weak to stand, low BP, rapid pulse)   Negative: Difficult to awaken or acting confused (e.g., disoriented, slurred speech)   Negative: Recent chest injury (i.e., past 24 hours)   Negative: [1] Coughed up blood AND [2] large amount (example: "a cup of blood")   Negative: Sounds like a life-threatening emergency to the triager   Negative: Difficulty breathing   Negative: Chest pain   Negative: Unclear to triager if the patient is coughing up blood or vomiting blood   Negative: History of prior "blood clot" in leg or lungs (i.e., deep vein thrombosis, pulmonary embolism)   Negative: History of inherited increased risk of blood clots (e.g., Factor 5 Leiden, Anti-thrombin 3, Protein C or Protein S deficiency, Prothrombin mutation)    Protocols used: COUGHING UP BLOOD-A-AH      "

## 2020-08-25 NOTE — ED NOTES
Patient placed on continuous cardiac monitor, automatic blood pressure cuff and continuous pulse oximeter.     Mitzy Molina, Patient Care Assistant  08/25/20 3216

## 2020-08-25 NOTE — ASSESSMENT & PLAN NOTE
Reviewed imaging personally with  and patient in bed demonstrating bilateral pulmonary emboli.  Reviewed note from Care everywhere.  At this point concur that treatment with intravenous heparin is a reasonable course of action she did present with hemoptysis today using intravenous heparin will allow better control if hemoptysis recurs in terms of discontinuation reversal.  Once patient is medically stabilized will probably convert to low-molecular heparin 1 milligram/kilogram twice a day throughout pregnancy.  She has had 2 thrombotic events I would recommend lifelong anticoagulation at this time.  Once completion of pregnancy discussed implications with her answered questions article from up-to-date followed to her on treatment

## 2020-08-26 PROBLEM — Z86.718 HISTORY OF CEREBRAL VENOUS SINUS THROMBOSIS: Status: ACTIVE | Noted: 2020-08-26

## 2020-08-26 PROBLEM — O88.211 PULMONARY EMBOLISM AFFECTING PREGNANCY IN FIRST TRIMESTER: Status: ACTIVE | Noted: 2020-08-26

## 2020-08-26 LAB
ALBUMIN SERPL BCP-MCNC: 3.1 G/DL (ref 3.5–5.2)
ALP SERPL-CCNC: 81 U/L (ref 55–135)
ALT SERPL W/O P-5'-P-CCNC: 12 U/L (ref 10–44)
ANION GAP SERPL CALC-SCNC: 11 MMOL/L (ref 8–16)
APTT BLDCRRT: 45 SEC (ref 21–32)
APTT BLDCRRT: 49 SEC (ref 21–32)
AST SERPL-CCNC: 17 U/L (ref 10–40)
BASOPHILS # BLD AUTO: 0.02 K/UL (ref 0–0.2)
BASOPHILS NFR BLD: 0.2 % (ref 0–1.9)
BILIRUB SERPL-MCNC: 0.9 MG/DL (ref 0.1–1)
BUN SERPL-MCNC: 7 MG/DL (ref 6–20)
CALCIUM SERPL-MCNC: 9.3 MG/DL (ref 8.7–10.5)
CHLORIDE SERPL-SCNC: 106 MMOL/L (ref 95–110)
CO2 SERPL-SCNC: 20 MMOL/L (ref 23–29)
CREAT SERPL-MCNC: 0.6 MG/DL (ref 0.5–1.4)
DIFFERENTIAL METHOD: ABNORMAL
EOSINOPHIL # BLD AUTO: 0.1 K/UL (ref 0–0.5)
EOSINOPHIL NFR BLD: 1.3 % (ref 0–8)
ERYTHROCYTE [DISTWIDTH] IN BLOOD BY AUTOMATED COUNT: 13.2 % (ref 11.5–14.5)
EST. GFR  (AFRICAN AMERICAN): >60 ML/MIN/1.73 M^2
EST. GFR  (NON AFRICAN AMERICAN): >60 ML/MIN/1.73 M^2
GLUCOSE SERPL-MCNC: 77 MG/DL (ref 70–110)
HCT VFR BLD AUTO: 42.6 % (ref 37–48.5)
HGB BLD-MCNC: 14.1 G/DL (ref 12–16)
IMM GRANULOCYTES # BLD AUTO: 0.04 K/UL (ref 0–0.04)
IMM GRANULOCYTES NFR BLD AUTO: 0.4 % (ref 0–0.5)
LYMPHOCYTES # BLD AUTO: 2 K/UL (ref 1–4.8)
LYMPHOCYTES NFR BLD: 20.1 % (ref 18–48)
MCH RBC QN AUTO: 30.8 PG (ref 27–31)
MCHC RBC AUTO-ENTMCNC: 33.1 G/DL (ref 32–36)
MCV RBC AUTO: 93 FL (ref 82–98)
MONOCYTES # BLD AUTO: 0.5 K/UL (ref 0.3–1)
MONOCYTES NFR BLD: 4.8 % (ref 4–15)
NEUTROPHILS # BLD AUTO: 7.3 K/UL (ref 1.8–7.7)
NEUTROPHILS NFR BLD: 73.2 % (ref 38–73)
NRBC BLD-RTO: 0 /100 WBC
PLATELET # BLD AUTO: 192 K/UL (ref 150–350)
PMV BLD AUTO: 11.6 FL (ref 9.2–12.9)
POTASSIUM SERPL-SCNC: 3.9 MMOL/L (ref 3.5–5.1)
PROT SERPL-MCNC: 6.9 G/DL (ref 6–8.4)
RBC # BLD AUTO: 4.58 M/UL (ref 4–5.4)
SODIUM SERPL-SCNC: 137 MMOL/L (ref 136–145)
WBC # BLD AUTO: 9.91 K/UL (ref 3.9–12.7)

## 2020-08-26 PROCEDURE — 99232 SBSQ HOSP IP/OBS MODERATE 35: CPT | Mod: ,,, | Performed by: INTERNAL MEDICINE

## 2020-08-26 PROCEDURE — 21400001 HC TELEMETRY ROOM

## 2020-08-26 PROCEDURE — 36415 COLL VENOUS BLD VENIPUNCTURE: CPT

## 2020-08-26 PROCEDURE — 99232 PR SUBSEQUENT HOSPITAL CARE,LEVL II: ICD-10-PCS | Mod: ,,, | Performed by: INTERNAL MEDICINE

## 2020-08-26 PROCEDURE — 25000003 PHARM REV CODE 250: Performed by: EMERGENCY MEDICINE

## 2020-08-26 PROCEDURE — 99232 SBSQ HOSP IP/OBS MODERATE 35: CPT | Mod: ,,, | Performed by: OBSTETRICS & GYNECOLOGY

## 2020-08-26 PROCEDURE — 80053 COMPREHEN METABOLIC PANEL: CPT

## 2020-08-26 PROCEDURE — 85730 THROMBOPLASTIN TIME PARTIAL: CPT

## 2020-08-26 PROCEDURE — 63600175 PHARM REV CODE 636 W HCPCS: Performed by: EMERGENCY MEDICINE

## 2020-08-26 PROCEDURE — 99232 PR SUBSEQUENT HOSPITAL CARE,LEVL II: ICD-10-PCS | Mod: ,,, | Performed by: OBSTETRICS & GYNECOLOGY

## 2020-08-26 PROCEDURE — 85025 COMPLETE CBC W/AUTO DIFF WBC: CPT

## 2020-08-26 RX ADMIN — HEPARIN SODIUM 18 UNITS/KG/HR: 5000 INJECTION INTRAVENOUS; SUBCUTANEOUS at 12:08

## 2020-08-26 NOTE — SUBJECTIVE & OBJECTIVE
Obstetric HPI:  Patient denies vaginal bleeding although she did have some mild cramping earlier. Continues to have intermittent chest pain, worse last night but improved this morning. +cough. No SOB. On room air and able to ambulate without difficulty.     Objective:     Vital Signs (Most Recent):  Temp: 97.4 °F (36.3 °C) (08/26/20 0730)  Pulse: 95 (08/26/20 0730)  Resp: 17 (08/26/20 0730)  BP: (!) 118/56 (08/26/20 0730)  SpO2: 95 % (08/26/20 0730) Vital Signs (24h Range):  Temp:  [97.4 °F (36.3 °C)-98.8 °F (37.1 °C)] 97.4 °F (36.3 °C)  Pulse:  [65-98] 95  Resp:  [12-21] 17  SpO2:  [95 %-100 %] 95 %  BP: (109-133)/(53-77) 118/56     Weight: 72.1 kg (158 lb 15.2 oz)  Body mass index is 24.9 kg/m².        Intake/Output Summary (Last 24 hours) at 8/26/2020 0908  Last data filed at 8/25/2020 2012  Gross per 24 hour   Intake 240 ml   Output --   Net 240 ml       Significant Labs:  Recent Lab Results       08/26/20  0643   08/25/20  2339   08/25/20  2200   08/25/20  1315   08/25/20  0923        Albumin 3.1             Alkaline Phosphatase 81             ALT 12             Anion Gap 11             Appearance, UA     Clear         aPTT 45.0  Comment:  aPTT therapeutic range = 39-69 seconds 49.0  Comment:  aPTT therapeutic range = 39-69 seconds     27.5  Comment:  aPTT therapeutic range = 39-69 seconds     AST 17             Baso # 0.02     0.02       Basophil% 0.2     0.2       Bilirubin (UA)     Negative         BILIRUBIN TOTAL 0.9  Comment:  For infants and newborns, interpretation of results should be based  on gestational age, weight and in agreement with clinical  observations.  Premature Infant recommended reference ranges:  Up to 24 hours.............<8.0 mg/dL  Up to 48 hours............<12.0 mg/dL  3-5 days..................<15.0 mg/dL  6-29 days.................<15.0 mg/dL               BNP               BUN, Bld 7             Calcium 9.3             Chloride 106             CO2 20             Color, UA      Yellow         Creatinine 0.6             D-Dimer         2.10  Comment:  The quantitative D-dimer assay should be used as an aid in   the diagnosis of deep vein thrombosis and pulmonary embolism  in patients with the appropriate presentation and clinical  history. The upper limit of the reference interval and the clinical   cut off   point are identical. Causes of a positive (>0.50 mg/L FEU) D-Dimer   test  include, but are not limited to: DVT, PE, DIC, thrombolytic   therapy, anticoagulant therapy, recent surgery, trauma, or   pregnancy, disseminated malignancy, aortic aneurysm, cirrhosis,  and severe infection. False negative results may occur in   patients with distal DVT.       Differential Method Automated     Automated       eGFR if  >60             eGFR if non  >60  Comment:  Calculation used to obtain the estimated glomerular filtration  rate (eGFR) is the CKD-EPI equation.                Eos # 0.1     0.1       Eosinophil% 1.3     0.4       Glucose 77             Glucose, UA     Negative         Gran # (ANC) 7.3     10.0       Gran% 73.2     81.7       Hematocrit 42.6     41.9       Hemoglobin 14.1     14.2       Immature Grans (Abs) 0.04  Comment:  Mild elevation in immature granulocytes is non specific and   can be seen in a variety of conditions including stress response,   acute inflammation, trauma and pregnancy. Correlation with other   laboratory and clinical findings is essential.       0.04  Comment:  Mild elevation in immature granulocytes is non specific and   can be seen in a variety of conditions including stress response,   acute inflammation, trauma and pregnancy. Correlation with other   laboratory and clinical findings is essential.         Immature Granulocytes 0.4     0.3       INR         0.9  Comment:  Coumadin Therapy:  2.0 - 3.0 for INR for all indicators except mechanical heart valves  and antiphospholipid syndromes which should use 2.5 - 3.5.        Ketones, UA     Negative         Leukocytes, UA     Negative         Lymph # 2.0     1.7       Lymph% 20.1     14.0       MCH 30.8     31.1       MCHC 33.1     33.9       MCV 93     92       Mono # 0.5     0.4       Mono% 4.8     3.4       MPV 11.6     10.7       NITRITE UA     Negative         nRBC 0     0       Occult Blood UA     Negative         pH, UA     7.0         Platelets 192     179       Potassium 3.9             PROTEIN TOTAL 6.9             Protein, UA     Negative  Comment:  Recommend a 24 hour urine protein or a urine   protein/creatinine ratio if globulin induced proteinuria is  clinically suspected.           Protime         10.1     RBC 4.58     4.57       RDW 13.2     12.9       SARS-CoV-2 RNA, Amplification, Qual         Negative  Comment:  This test utilizes isothermal nucleic acid amplification   technology to detect the SARS-CoV-2 RdRp nucleic acid segment.   The analytical sensitivity (limit of detection) is 125 genome   equivalents/mL.   A POSITIVE result implies infection with the SARS-CoV-2 virus;  the patient is presumed to be contagious.    A NEGATIVE result means that SARS-CoV-2 nucleic acids are not  present above the limit of detection. A NEGATIVE result should be   treated as presumptive. It does not rule out the possibility of   COVID-19 and should not be the sole basis for treatment decisions.   If COVID-19 is strongly suspected based on clinical and exposure   history, re-testing using an alternate molecular assay should be   considered.   This test is only for use under the Food and Drug   Administration s Emergency Use Authorization (EUA).   Commercial kits are provided by [employer].   Performance characteristics of the EUA have been independently  verified by [facility] Department of  Pathology and Laboratory Medicine.   _________________________________________________________________  The ID NOW COVID-19 Letter of Authorization, along with the    authorized Fact Sheet for Healthcare Providers, the authorized Fact  Sheet for Patients, and authorized labeling are available on the FDA   website:  www.fda.gov/MedicalDevices/Safety/EmergencySituations/fxf733680.htm       Sodium 137             Specific [place], UA     1.010         Specimen UA     Urine, Clean Catch         Troponin I               UROBILINOGEN UA     Negative         WBC 9.91     12.26                        08/25/20  0915        Albumin 3.3     Alkaline Phosphatase 85     ALT 12     Anion Gap 13     Appearance, UA       aPTT       AST 18     Baso # 0.02     Basophil% 0.2     Bilirubin (UA)       BILIRUBIN TOTAL 0.7  Comment:  For infants and newborns, interpretation of results should be based  on gestational age, weight and in agreement with clinical  observations.  Premature Infant recommended reference ranges:  Up to 24 hours.............<8.0 mg/dL  Up to 48 hours............<12.0 mg/dL  3-5 days..................<15.0 mg/dL  6-29 days.................<15.0 mg/dL       BNP 41  Comment:  Values of less than 100 pg/ml are consistent with non-CHF populations.     BUN, Bld 9     Calcium 9.0     Chloride 104     CO2 19     Color, UA       Creatinine 0.7     D-Dimer       Differential Method Automated     eGFR if  >60     eGFR if non  >60  Comment:  Calculation used to obtain the estimated glomerular filtration  rate (eGFR) is the CKD-EPI equation.        Eos # 0.1     Eosinophil% 0.8     Glucose 84     Glucose, UA       Gran # (ANC) 8.2     Gran% 80.3     Hematocrit 45.5     Hemoglobin 15.2     Immature Grans (Abs) 0.05  Comment:  Mild elevation in immature granulocytes is non specific and   can be seen in a variety of conditions including stress response,   acute inflammation, trauma and pregnancy. Correlation with other   laboratory and clinical findings is essential.       Immature Granulocytes 0.5     INR       Ketones, UA       Leukocytes, UA       Lymph #  1.5     Lymph% 14.2     MCH 31.3     MCHC 33.4     MCV 94     Mono # 0.4     Mono% 4.0     MPV 10.6     NITRITE UA       nRBC 0     Occult Blood UA       pH, UA       Platelets 179     Potassium 3.8     PROTEIN TOTAL 7.3     Protein, UA       Protime       RBC 4.85     RDW 13.1     SARS-CoV-2 RNA, Amplification, Qual       Sodium 136     Specific Gravity, UA       Specimen UA       Troponin I <0.006  Comment:  The reference interval for Troponin I represents the 99th percentile   cutoff   for our facility and is consistent with 3rd generation assay   performance.       UROBILINOGEN UA       WBC 10.26           Physical Exam:   Constitutional: She is oriented to person, place, and time. She appears well-developed and well-nourished. No distress.       Cardiovascular: Normal rate, regular rhythm and normal heart sounds.    No murmur heard.   Pulmonary/Chest: Effort normal and breath sounds normal. No respiratory distress. She has no wheezes. She has no rales.        Abdominal: Soft. Bowel sounds are normal. She exhibits no distension. There is no abdominal tenderness. There is no guarding.             Musculoskeletal: No edema.       Neurological: She is alert and oriented to person, place, and time.    Skin: Skin is warm and dry. No rash noted. She is not diaphoretic.

## 2020-08-26 NOTE — HOSPITAL COURSE
08/25/2020-bilateral PE. Patient was initiated on heparin gtt as per Hospital Medicine. She will be transitioned to Lovenox prior to discharge and will remain on this for duration of pregnancy.

## 2020-08-26 NOTE — SUBJECTIVE & OBJECTIVE
"Interval History: Remains on heparin gtt. Reports improvement in symptoms. Reports hemoptysis x 1 this am with "streak of blood". Reports resolution of CP. Denies any bleeding    Oncology Treatment Plan:   [No treatment plan]    Medications:  Continuous Infusions:   heparin (porcine) in D5W 18 Units/kg/hr (08/25/20 1738)     Scheduled Meds:   melatonin  9 mg Oral QHS     PRN Meds:acetaminophen, heparin (PORCINE), heparin (PORCINE), HYDROcodone-acetaminophen, ondansetron, sodium chloride 0.9%     Review of Systems   Constitutional: Negative for activity change, appetite change, chills, diaphoresis, fatigue, fever and unexpected weight change.   HENT: Negative for congestion, mouth sores, nosebleeds, sore throat, trouble swallowing and voice change.         Hemoptysis x 1 this am   Eyes: Negative for photophobia and visual disturbance.   Respiratory: Negative for cough, chest tightness, shortness of breath and wheezing.    Cardiovascular: Negative for chest pain, palpitations and leg swelling.   Gastrointestinal: Negative for abdominal distention, abdominal pain, anal bleeding, blood in stool, constipation, diarrhea, nausea and vomiting.   Genitourinary: Negative for difficulty urinating, dysuria and hematuria.   Musculoskeletal: Negative for arthralgias, back pain and myalgias.   Skin: Negative for pallor, rash and wound.   Neurological: Negative for dizziness, syncope, weakness and headaches.   Hematological: Negative for adenopathy. Does not bruise/bleed easily.   Psychiatric/Behavioral: The patient is not nervous/anxious.      Objective:     Vital Signs (Most Recent):  Temp: 97.4 °F (36.3 °C) (08/26/20 0730)  Pulse: 95 (08/26/20 0730)  Resp: 17 (08/26/20 0730)  BP: (!) 118/56 (08/26/20 0730)  SpO2: 95 % (08/26/20 0730) Vital Signs (24h Range):  Temp:  [97.4 °F (36.3 °C)-98.8 °F (37.1 °C)] 97.4 °F (36.3 °C)  Pulse:  [65-98] 95  Resp:  [16-18] 17  SpO2:  [95 %-99 %] 95 %  BP: (109-133)/(53-77) 118/56     Weight: " 72.1 kg (158 lb 15.2 oz)  Body mass index is 24.9 kg/m².  Body surface area is 1.85 meters squared.      Intake/Output Summary (Last 24 hours) at 8/26/2020 1124  Last data filed at 8/25/2020 2012  Gross per 24 hour   Intake 240 ml   Output --   Net 240 ml       Physical Exam  Vitals signs reviewed.   Constitutional:       Appearance: She is well-developed.   HENT:      Head: Normocephalic.      Right Ear: External ear normal.      Left Ear: External ear normal.      Nose: Nose normal.   Eyes:      General: Lids are normal. No scleral icterus.        Right eye: No discharge.         Left eye: No discharge.      Conjunctiva/sclera: Conjunctivae normal.   Neck:      Musculoskeletal: Normal range of motion.      Thyroid: No thyroid mass.   Cardiovascular:      Rate and Rhythm: Normal rate and regular rhythm.      Heart sounds: Normal heart sounds. No murmur.   Pulmonary:      Effort: Pulmonary effort is normal. No respiratory distress.      Breath sounds: Normal breath sounds. No wheezing, rhonchi or rales.   Abdominal:      General: Bowel sounds are normal. There is no distension.      Palpations: Abdomen is soft.      Tenderness: There is no abdominal tenderness.   Genitourinary:     Comments: deferred  Musculoskeletal: Normal range of motion.   Skin:     General: Skin is warm and dry.   Neurological:      Mental Status: She is alert and oriented to person, place, and time.   Psychiatric:         Speech: Speech normal.         Behavior: Behavior normal. Behavior is cooperative.         Thought Content: Thought content normal.         Significant Labs:   BMP:   Recent Labs   Lab 08/25/20  0915 08/26/20  0643   GLU 84 77    137   K 3.8 3.9    106   CO2 19* 20*   BUN 9 7   CREATININE 0.7 0.6   CALCIUM 9.0 9.3   , CBC:   Recent Labs   Lab 08/25/20  0915 08/25/20  1315 08/26/20  0643   WBC 10.26 12.26 9.91   HGB 15.2 14.2 14.1   HCT 45.5 41.9 42.6    179 192   , LFTs:   Recent Labs   Lab 08/25/20  0915  08/26/20  0643   ALT 12 12   AST 18 17   ALKPHOS 85 81   BILITOT 0.7 0.9   PROT 7.3 6.9   ALBUMIN 3.3* 3.1*    and All pertinent labs from the last 24 hours have been reviewed.    Diagnostic Results:  I have reviewed all pertinent imaging results/findings within the past 24 hours.

## 2020-08-26 NOTE — ASSESSMENT & PLAN NOTE
Patient with history of superior sagittal sinus thrombosis (2017).   Management as per hospital medicine and hematology  -Appears to be doing well this AM, saturating well on RA and hemodynamically stable  -Patient will require Lovenox for remainder of pregnancy

## 2020-08-26 NOTE — PLAN OF CARE
CM met with patient at bedside to assess for discharge needs. Pt states that she lives at home with her spouse and child, and is independent with her needs. Pt does not use home health or any medical equipment. Her primary help at home is her spouse. She denied any post discharge needs. CM explained that needs will be dependent on hospital progress. CM provided a transitional care folder, information on advanced directives, information on pharmacy bedside delivery, and discharge planning begins on admission with contact information for any needs/questions.     D/C Plan: Home  PCP: Nadeem Starks MD  Preferred Pharmacy: [facility]  Discharge transportation: Family  [facility]: Active  Pharmacy Bedside Delivery: Yes       08/26/20 1115   Discharge Assessment   Assessment Type Discharge Planning Assessment   Confirmed/corrected address and phone number on facesheet? Yes   Assessment information obtained from? Patient   Expected Length of Stay (days)   (TBD)   Communicated expected length of stay with patient/caregiver yes   Prior to hospitilization cognitive status: Alert/Oriented   Prior to hospitalization functional status: Independent   Current cognitive status: Alert/Oriented   Current Functional Status: Independent   Facility Arrived From: Home   Lives With spouse   Able to Return to Prior Arrangements no   Is patient able to care for self after discharge? Yes   Who are your caregiver(s) and their phone number(s)? Brad Ochoa, Spouse- [phone]   Patient's perception of discharge disposition   (TBD)   Readmission Within the Last 30 Days no previous admission in last 30 days   Patient currently being followed by outpatient case management? No   Patient currently receives any other outside agency services? No   Equipment Currently Used at Home none   Do you have any problems affording any of your prescribed medications? No   Is the patient taking medications as prescribed? yes   Does the patient have transportation  home? Yes   Transportation Anticipated family or friend will provide   Dialysis Name and Scheduled days NA   Does the patient receive services at the [facility]mad[facility] Clinic? No   Discharge Plan A Home with family   DME Needed Upon Discharge  none   Patient/Family in Agreement with Plan yes

## 2020-08-26 NOTE — HPI
34 y/o g1po at 11 wks per pt history with chest tightness and hemoptysis.  Patient with hx of dvt 3 yrs prior.  Has established OB care with Dr Ballesteros () and was advised to take daily aspirn

## 2020-08-26 NOTE — PLAN OF CARE
Ivelisse with [employer] can assist with any discharge planning needs.  Ivelisse's number is [phone]

## 2020-08-26 NOTE — ASSESSMENT & PLAN NOTE
Reviewed imaging personally with  and patient in bed demonstrating bilateral pulmonary emboli.  Reviewed note from Care everywhere.  At this point concur that treatment with intravenous heparin is a reasonable course of action she did present with hemoptysis today using intravenous heparin will allow better control if hemoptysis recurs in terms of discontinuation reversal.  Once patient is medically stabilized will probably convert to low-molecular heparin 1 milligram/kilogram twice a day throughout pregnancy.  She has had 2 thrombotic events I would recommend lifelong anticoagulation at this time.  Once completion of pregnancy discussed implications with her answered questions article from up-to-date followed to her on treatment    8/26/20  --Laboratory review unremarkable   --Continue heparin gtt. Monitor hemoptysis and other S&S bleeding overnight. If remains stable transition to Lovenox 1mg/kg injections BID in am. Will arrange outpatient follow up with Hematology/Oncology for further evaluation/management. Will plan to transition to lifelong oral anticoagulation postpartum

## 2020-08-26 NOTE — PROGRESS NOTES
[facility] -   Obstetrics  Antepartum Progress Note    Patient Name: Jo-Ann Ochoa  MRN: [id number]  Admission Date: 8/25/2020  Hospital Length of Stay: 1 days  Attending Physician: Gato Espinoza, *  Primary Care Provider: Diomedes Starks MD    Subjective:     Principal Problem:Bilateral pulmonary embolism    HPI:    36 y/o g1po at 11 wks per pt history with chest tightness and hemoptysis.  Patient with hx of dvt 3 yrs prior.  Has established OB care with Dr Ballesteros () and was advised to take daily aspirn    Hospital Course:  08/25/2020-bilateral PE. Patient was initiated on heparin gtt as per Hospital Medicine.      Obstetric HPI:  Patient denies vaginal bleeding although she did have some mild cramping earlier. Continues to have intermittent chest pain, worse last night but improved this morning. +cough. No SOB. On room air and able to ambulate without difficulty.     Objective:     Vital Signs (Most Recent):  Temp: 97.4 °F (36.3 °C) (08/26/20 0730)  Pulse: 95 (08/26/20 0730)  Resp: 17 (08/26/20 0730)  BP: (!) 118/56 (08/26/20 0730)  SpO2: 95 % (08/26/20 0730) Vital Signs (24h Range):  Temp:  [97.4 °F (36.3 °C)-98.8 °F (37.1 °C)] 97.4 °F (36.3 °C)  Pulse:  [65-98] 95  Resp:  [12-21] 17  SpO2:  [95 %-100 %] 95 %  BP: (109-133)/(53-77) 118/56     Weight: 72.1 kg (158 lb 15.2 oz)  Body mass index is 24.9 kg/m².        Intake/Output Summary (Last 24 hours) at 8/26/2020 0908  Last data filed at 8/25/2020 2012  Gross per 24 hour   Intake 240 ml   Output --   Net 240 ml       Significant Labs:  Recent Lab Results       08/26/20  0643   08/25/20  2339   08/25/20  2200   08/25/20  1315   08/25/20  0923        Albumin 3.1             Alkaline Phosphatase 81             ALT 12             Anion Gap 11             Appearance, UA     Clear         aPTT 45.0  Comment:  aPTT therapeutic range = 39-69 seconds 49.0  Comment:  aPTT therapeutic range = 39-69 seconds     27.5  Comment:  aPTT therapeutic  range = 39-69 seconds     AST 17             Baso # 0.02     0.02       Basophil% 0.2     0.2       Bilirubin (UA)     Negative         BILIRUBIN TOTAL 0.9  Comment:  For infants and newborns, interpretation of results should be based  on gestational age, weight and in agreement with clinical  observations.  Premature Infant recommended reference ranges:  Up to 24 hours.............<8.0 mg/dL  Up to 48 hours............<12.0 mg/dL  3-5 days..................<15.0 mg/dL  6-29 days.................<15.0 mg/dL               BNP               BUN, Bld 7             Calcium 9.3             Chloride 106             CO2 20             Color, UA     Yellow         Creatinine 0.6             D-Dimer         2.10  Comment:  The quantitative D-dimer assay should be used as an aid in   the diagnosis of deep vein thrombosis and pulmonary embolism  in patients with the appropriate presentation and clinical  history. The upper limit of the reference interval and the clinical   cut off   point are identical. Causes of a positive (>0.50 mg/L FEU) D-Dimer   test  include, but are not limited to: DVT, PE, DIC, thrombolytic   therapy, anticoagulant therapy, recent surgery, trauma, or   pregnancy, disseminated malignancy, aortic aneurysm, cirrhosis,  and severe infection. False negative results may occur in   patients with distal DVT.       Differential Method Automated     Automated       eGFR if  >60             eGFR if non  >60  Comment:  Calculation used to obtain the estimated glomerular filtration  rate (eGFR) is the CKD-EPI equation.                Eos # 0.1     0.1       Eosinophil% 1.3     0.4       Glucose 77             Glucose, UA     Negative         Gran # (ANC) 7.3     10.0       Gran% 73.2     81.7       Hematocrit 42.6     41.9       Hemoglobin 14.1     14.2       Immature Grans (Abs) 0.04  Comment:  Mild elevation in immature granulocytes is non specific and   can be seen in a variety of  conditions including stress response,   acute inflammation, trauma and pregnancy. Correlation with other   laboratory and clinical findings is essential.       0.04  Comment:  Mild elevation in immature granulocytes is non specific and   can be seen in a variety of conditions including stress response,   acute inflammation, trauma and pregnancy. Correlation with other   laboratory and clinical findings is essential.         Immature Granulocytes 0.4     0.3       INR         0.9  Comment:  Coumadin Therapy:  2.0 - 3.0 for INR for all indicators except mechanical heart valves  and antiphospholipid syndromes which should use 2.5 - 3.5.       Ketones, UA     Negative         Leukocytes, UA     Negative         Lymph # 2.0     1.7       Lymph% 20.1     14.0       MCH 30.8     31.1       MCHC 33.1     33.9       MCV 93     92       Mono # 0.5     0.4       Mono% 4.8     3.4       MPV 11.6     10.7       NITRITE UA     Negative         nRBC 0     0       Occult Blood UA     Negative         pH, UA     7.0         Platelets 192     179       Potassium 3.9             PROTEIN TOTAL 6.9             Protein, UA     Negative  Comment:  Recommend a 24 hour urine protein or a urine   protein/creatinine ratio if globulin induced proteinuria is  clinically suspected.           Protime         10.1     RBC 4.58     4.57       RDW 13.2     12.9       SARS-CoV-2 RNA, Amplification, Qual         Negative  Comment:  This test utilizes isothermal nucleic acid amplification   technology to detect the SARS-CoV-2 RdRp nucleic acid segment.   The analytical sensitivity (limit of detection) is 125 genome   equivalents/mL.   A POSITIVE result implies infection with the SARS-CoV-2 virus;  the patient is presumed to be contagious.    A NEGATIVE result means that SARS-CoV-2 nucleic acids are not  present above the limit of detection. A NEGATIVE result should be   treated as presumptive. It does not rule out the possibility of   COVID-19 and  should not be the sole basis for treatment decisions.   If COVID-19 is strongly suspected based on clinical and exposure   history, re-testing using an alternate molecular assay should be   considered.   This test is only for use under the Food and Drug   Administration s Emergency Use Authorization (EUA).   Commercial kits are provided by [employer].   Performance characteristics of the EUA have been independently  verified by [facility] Department of  Pathology and Laboratory Medicine.   _________________________________________________________________  The ID NOW COVID-19 Letter of Authorization, along with the   authorized Fact Sheet for Healthcare Providers, the authorized Fact  Sheet for Patients, and authorized labeling are available on the FDA   website:  www.fda.gov/MedicalDevices/Safety/EmergencySituations/vxy568057.htm       Sodium 137             Specific Carbon, UA     1.010         Specimen UA     Urine, Clean Catch         Troponin I               UROBILINOGEN UA     Negative         WBC 9.91     12.26                        08/25/20  0915        Albumin 3.3     Alkaline Phosphatase 85     ALT 12     Anion Gap 13     Appearance, UA       aPTT       AST 18     Baso # 0.02     Basophil% 0.2     Bilirubin (UA)       BILIRUBIN TOTAL 0.7  Comment:  For infants and newborns, interpretation of results should be based  on gestational age, weight and in agreement with clinical  observations.  Premature Infant recommended reference ranges:  Up to 24 hours.............<8.0 mg/dL  Up to 48 hours............<12.0 mg/dL  3-5 days..................<15.0 mg/dL  6-29 days.................<15.0 mg/dL       BNP 41  Comment:  Values of less than 100 pg/ml are consistent with non-CHF populations.     BUN, Bld 9     Calcium 9.0     Chloride 104     CO2 19     Color, UA       Creatinine 0.7     D-Dimer       Differential Method Automated     eGFR if  >60     eGFR if non   >60  Comment:  Calculation used to obtain the estimated glomerular filtration  rate (eGFR) is the CKD-EPI equation.        Eos # 0.1     Eosinophil% 0.8     Glucose 84     Glucose, UA       Gran # (ANC) 8.2     Gran% 80.3     Hematocrit 45.5     Hemoglobin 15.2     Immature Grans (Abs) 0.05  Comment:  Mild elevation in immature granulocytes is non specific and   can be seen in a variety of conditions including stress response,   acute inflammation, trauma and pregnancy. Correlation with other   laboratory and clinical findings is essential.       Immature Granulocytes 0.5     INR       Ketones, UA       Leukocytes, UA       Lymph # 1.5     Lymph% 14.2     MCH 31.3     MCHC 33.4     MCV 94     Mono # 0.4     Mono% 4.0     MPV 10.6     NITRITE UA       nRBC 0     Occult Blood UA       pH, UA       Platelets 179     Potassium 3.8     PROTEIN TOTAL 7.3     Protein, UA       Protime       RBC 4.85     RDW 13.1     SARS-CoV-2 RNA, Amplification, Qual       Sodium 136     Specific Gravity, UA       Specimen UA       Troponin I <0.006  Comment:  The reference interval for Troponin I represents the 99th percentile   cutoff   for our facility and is consistent with 3rd generation assay   performance.       UROBILINOGEN UA       WBC 10.26           Physical Exam:   Constitutional: She is oriented to person, place, and time. She appears well-developed and well-nourished. No distress.       Cardiovascular: Normal rate, regular rhythm and normal heart sounds.    No murmur heard.   Pulmonary/Chest: Effort normal and breath sounds normal. No respiratory distress. She has no wheezes. She has no rales.        Abdominal: Soft. Bowel sounds are normal. She exhibits no distension. There is no abdominal tenderness. There is no guarding.             Musculoskeletal: No edema.       Neurological: She is alert and oriented to person, place, and time.    Skin: Skin is warm and dry. No rash noted. She is not diaphoretic.         Assessment/Plan:     35 y.o. female  at Unknown for:    * Bilateral pulmonary embolism  Patient with history of superior sagittal sinus thrombosis (2017).   Management as per hospital medicine and hematology  -Appears to be doing well this AM, saturating well on RA and hemodynamically stable  -Patient will require Lovenox for remainder of pregnancy    First trimester pregnancy  Ob care established with Dr Ballesteros at [facility] and plans follow up with her.  No ob issues  Aware of safety of heparin and lovenox in pregnancy  Follow up OB ultrasound          Catalina Rogers PA-C  Obstetrics  [facility]

## 2020-08-26 NOTE — PROGRESS NOTES
[facility]  Obstetrics  Antepartum Progress Note    Patient Name: Jo-Ann Ochoa  MRN: [id number]  Admission Date: 8/25/2020  Hospital Length of Stay: 0 days  Attending Physician: Gato Espinoza, *  Primary Care Provider: Diomedes Starks MD    Subjective:     Principal Problem:Bilateral pulmonary embolism    HPI:    36 y/o g1po at 11 wks per pt history with chest tightness and hemoptysis.  Patient with hx of dvt 3 yrs prior.  Has established OB care with Dr Ballesteros () and was advised to take daily aspirn    Hospital Course:  08/25/2020-bilateral PE      Obstetric HPI:  Patient reports None contractions, too early for fetal movement, absent vaginal bleeding , absent loss of fluid      Objective:     Vital Signs (Most Recent):  Temp: 97.6 °F (36.4 °C) (08/25/20 1918)  Pulse: 83 (08/25/20 1918)  Resp: 18 (08/25/20 1918)  BP: (!) 122/58 (08/25/20 1918)  SpO2: 96 % (08/25/20 1918) Vital Signs (24h Range):  Temp:  [97.6 °F (36.4 °C)-98.7 °F (37.1 °C)] 97.6 °F (36.4 °C)  Pulse:  [] 83  Resp:  [12-21] 18  SpO2:  [96 %-100 %] 96 %  BP: (113-135)/(56-79) 122/58     Weight: 72.1 kg (158 lb 15.2 oz)  Body mass index is 24.9 kg/m².    FHT: too early for doppler  No intake or output data in the 24 hours ending 08/25/20 2146    Cervix deferred     Significant Labs:  Recent Lab Results       08/25/20  1315   08/25/20  0923   08/25/20  0915        Albumin     3.3     Alkaline Phosphatase     85     ALT     12     Anion Gap     13     aPTT   27.5  Comment:  aPTT therapeutic range = 39-69 seconds       AST     18     Baso # 0.02   0.02     Basophil% 0.2   0.2     BILIRUBIN TOTAL     0.7  Comment:  For infants and newborns, interpretation of results should be based  on gestational age, weight and in agreement with clinical  observations.  Premature Infant recommended reference ranges:  Up to 24 hours.............<8.0 mg/dL  Up to 48 hours............<12.0 mg/dL  3-5 days..................<15.0  mg/dL  6-29 days.................<15.0 mg/dL       BNP     41  Comment:  Values of less than 100 pg/ml are consistent with non-CHF populations.     BUN, Bld     9     Calcium     9.0     Chloride     104     CO2     19     Creatinine     0.7     D-Dimer   2.10  Comment:  The quantitative D-dimer assay should be used as an aid in   the diagnosis of deep vein thrombosis and pulmonary embolism  in patients with the appropriate presentation and clinical  history. The upper limit of the reference interval and the clinical   cut off   point are identical. Causes of a positive (>0.50 mg/L FEU) D-Dimer   test  include, but are not limited to: DVT, PE, DIC, thrombolytic   therapy, anticoagulant therapy, recent surgery, trauma, or   pregnancy, disseminated malignancy, aortic aneurysm, cirrhosis,  and severe infection. False negative results may occur in   patients with distal DVT.         Differential Method Automated   Automated     eGFR if      >60     eGFR if non      >60  Comment:  Calculation used to obtain the estimated glomerular filtration  rate (eGFR) is the CKD-EPI equation.        Eos # 0.1   0.1     Eosinophil% 0.4   0.8     Glucose     84     Gran # (ANC) 10.0   8.2     Gran% 81.7   80.3     Hematocrit 41.9   45.5     Hemoglobin 14.2   15.2     Immature Grans (Abs) 0.04  Comment:  Mild elevation in immature granulocytes is non specific and   can be seen in a variety of conditions including stress response,   acute inflammation, trauma and pregnancy. Correlation with other   laboratory and clinical findings is essential.     0.05  Comment:  Mild elevation in immature granulocytes is non specific and   can be seen in a variety of conditions including stress response,   acute inflammation, trauma and pregnancy. Correlation with other   laboratory and clinical findings is essential.       Immature Granulocytes 0.3   0.5     INR   0.9  Comment:  Coumadin Therapy:  2.0 - 3.0 for INR for  all indicators except mechanical heart valves  and antiphospholipid syndromes which should use 2.5 - 3.5.         Lymph # 1.7   1.5     Lymph% 14.0   14.2     MCH 31.1   31.3     MCHC 33.9   33.4     MCV 92   94     Mono # 0.4   0.4     Mono% 3.4   4.0     MPV 10.7   10.6     nRBC 0   0     Platelets 179   179     Potassium     3.8     PROTEIN TOTAL     7.3     Protime   10.1       RBC 4.57   4.85     RDW 12.9   13.1     SARS-CoV-2 RNA, Amplification, Qual   Negative  Comment:  This test utilizes isothermal nucleic acid amplification   technology to detect the SARS-CoV-2 RdRp nucleic acid segment.   The analytical sensitivity (limit of detection) is 125 genome   equivalents/mL.   A POSITIVE result implies infection with the SARS-CoV-2 virus;  the patient is presumed to be contagious.    A NEGATIVE result means that SARS-CoV-2 nucleic acids are not  present above the limit of detection. A NEGATIVE result should be   treated as presumptive. It does not rule out the possibility of   COVID-19 and should not be the sole basis for treatment decisions.   If COVID-19 is strongly suspected based on clinical and exposure   history, re-testing using an alternate molecular assay should be   considered.   This test is only for use under the Food and Drug   Administration s Emergency Use Authorization (EUA).   Commercial kits are provided by [employer].   Performance characteristics of the EUA have been independently  verified by [facility] Department of  Pathology and Laboratory Medicine.   _________________________________________________________________  The ID NOW COVID-19 Letter of Authorization, along with the   authorized Fact Sheet for Healthcare Providers, the authorized Fact  Sheet for Patients, and authorized labeling are available on the FDA   website:  www.fda.gov/MedicalDevices/Safety/EmergencySituations/nxb973303.htm         Sodium     136     Troponin I     <0.006  Comment:  The reference  interval for Troponin I represents the 99th percentile   cutoff   for our facility and is consistent with 3rd generation assay   performance.       WBC 12.26   10.26           Physical Exam:   Constitutional: She appears well-developed.     Eyes: Pupils are equal, round, and reactive to light. Conjunctivae and EOM are normal.    Neck: Normal range of motion. Neck supple.     Pulmonary/Chest: Effort normal.        Abdominal: Soft. Bowel sounds are normal. She exhibits no abdominal incision. There is no abdominal tenderness.             Musculoskeletal: Normal range of motion.       Neurological: She is alert.    Skin: Skin is warm.    Psychiatric: She has a normal mood and affect.       Assessment/Plan:     35 y.o. female  at Unknown for:    * Bilateral pulmonary embolism  Management as per hospital medicine and hematology    First trimester pregnancy  Ob care established with Dr Ballesteros at [facility] and plans follow up with her.  No ob issues  Aware of safety of heparin and lovenox in pregnancy  Will get ob sono in am to confirm viability          Anna Robledo MD  Obstetrics  [facility]

## 2020-08-26 NOTE — ASSESSMENT & PLAN NOTE
H/O sagittal sinus thrombosis s/p coumadin   Hematology consulted   Started on heparin  No sign of right heart strain   Most likely will need life long  OAC   8/26/20  -Continue heparin gtt for 24 hours   -Lovenox on discharge

## 2020-08-26 NOTE — HOSPITAL COURSE
36 y/o female admitted for bilateral pulmonary embolism. Patient being treated with heparin gtt. Hematology following. No evidence of right heart strain per CTA chest. As of 8/27 symptoms improved. No hemoptysis overnight. H/H stable. Heparin gtt discontinued. Will discharge on low-molecular heparin 1 milligram/kilogram twice a day per hematology recommendation. Patient to follow-up with hematology in 2 weeks for any additional hypercoagulable workup. Patient also to follow-up with PCP and OB/GYN outpatient. Patient seen and examined on the date of discharge and suitable for discharge.

## 2020-08-26 NOTE — ASSESSMENT & PLAN NOTE
Ob care established with Dr Ballesteros at [facility] and plans follow up with her.  No ob issues  Aware of safety of heparin and lovenox in pregnancy  Follow up OB ultrasound

## 2020-08-26 NOTE — PLAN OF CARE
Heparin gtt @ 11.8mL/hr. Bleeding precautions maintained. Infrequent cough with slight blood streaks. Heart monitor 8636. Voices no c/o's of pain. Remains free from injury/incident. Call light in reach.

## 2020-08-26 NOTE — PROGRESS NOTES
[facility] Hospital Medicine  Progress Note    Patient Name: Jo-Ann Ochoa  MRN: [id number]  Patient Class: IP- Inpatient   Admission Date: 8/25/2020  Length of Stay: 1 days  Attending Physician: Gato Espinoza, *  Primary Care Provider: Diomedes Starks MD        Subjective:     Principal Problem:Bilateral pulmonary embolism        HPI:  36 y/o wf with a PMHx of superior sagittal sinus thrombus s/p coumadin x 3 months  And   11 week pregnancy presented to the ER with a C/O  Hemoptysis  Since yesterday . She report  Had one episode of hemoptysis yesterday  . It is associated with a chest pain which worse with inspiration ( started today ). She denies  Any fever , chills , sob  , recent travel , LE swelling , abdominal pain , nausea , vomit  , diarrhea or  sx .  She was in her usual state of health before yesterday .   ER COURSE :  D-dimer 2 , CXR show no acute finding , B/L LE US  Negative for DVT  , CTA Chest show B/L PE .The ER Doctor Discussed the case with hematology which recommend heparin drip . The case was discussed with IR which did not recommend  EKKOs at this time   ER VS  Initial Vitals [08/25/20 0852]   BP Pulse Resp Temp SpO2   135/79 (!) 114 20 98.7 °F (37.1 °C) 97 %         Pt will be admitted  To inpatient with a Dx of acute B/L PE  And First trimester pregnancy       Overview/Hospital Course:  36 y/o female admitted for bilateral pulmonary embolism. Patient being treated with heparin gtt. Hematology following. No evidence of right heart strain per CTA chest.     Interval History: Pt lying in bed, no acute distress noted. Heparin gtt infusing. Continue current treatment plan.     Review of Systems   Constitutional: Negative.  Negative for chills.   HENT: Negative.    Eyes: Negative.    Respiratory: Negative.         Hemoptysis    Cardiovascular: Positive for chest pain.   Gastrointestinal: Negative.    Endocrine: Negative.    Genitourinary: Negative.     Musculoskeletal: Negative.    Allergic/Immunologic: Negative.    Neurological: Negative.    Hematological: Negative.    Psychiatric/Behavioral: Negative.      Objective:     Vital Signs (Most Recent):  Temp: 97.4 °F (36.3 °C) (08/26/20 0730)  Pulse: 95 (08/26/20 0730)  Resp: 17 (08/26/20 0730)  BP: (!) 118/56 (08/26/20 0730)  SpO2: 95 % (08/26/20 0730) Vital Signs (24h Range):  Temp:  [97.4 °F (36.3 °C)-98.8 °F (37.1 °C)] 97.4 °F (36.3 °C)  Pulse:  [65-98] 95  Resp:  [16-18] 17  SpO2:  [95 %-99 %] 95 %  BP: (109-133)/(53-77) 118/56     Weight: 72.1 kg (158 lb 15.2 oz)  Body mass index is 24.9 kg/m².    Intake/Output Summary (Last 24 hours) at 8/26/2020 1116  Last data filed at 8/25/2020 2012  Gross per 24 hour   Intake 240 ml   Output --   Net 240 ml      Physical Exam  Constitutional:       General: She is not in acute distress.     Appearance: Normal appearance. She is not ill-appearing.   HENT:      Head: Normocephalic and atraumatic.      Nose: Nose normal. No congestion or rhinorrhea.      Mouth/Throat:      Mouth: Mucous membranes are moist.   Eyes:      Extraocular Movements: Extraocular movements intact.      Pupils: Pupils are equal, round, and reactive to light.   Neck:      Musculoskeletal: Normal range of motion and neck supple. No neck rigidity or muscular tenderness.   Cardiovascular:      Rate and Rhythm: Regular rhythm.      Pulses: Normal pulses.      Heart sounds: Normal heart sounds. No murmur.   Pulmonary:      Effort: Pulmonary effort is normal. No respiratory distress.      Breath sounds: No stridor. No wheezing or rhonchi.   Abdominal:      General: Abdomen is flat. There is no distension.      Palpations: Abdomen is soft. There is no mass.      Tenderness: There is no abdominal tenderness.      Hernia: No hernia is present.   Musculoskeletal: Normal range of motion.         General: No swelling, tenderness, deformity or signs of injury.   Skin:     General: Skin is warm.      Coloration:  Skin is not jaundiced or pale.   Neurological:      General: No focal deficit present.      Mental Status: She is alert and oriented to person, place, and time.      Cranial Nerves: No cranial nerve deficit.      Sensory: No sensory deficit.   Psychiatric:         Mood and Affect: Mood normal.         Behavior: Behavior normal.         Significant Labs:   BMP:   Recent Labs   Lab 08/26/20  0643   GLU 77      K 3.9      CO2 20*   BUN 7   CREATININE 0.6   CALCIUM 9.3     CBC:   Recent Labs   Lab 08/25/20  0915 08/25/20  1315 08/26/20  0643   WBC 10.26 12.26 9.91   HGB 15.2 14.2 14.1   HCT 45.5 41.9 42.6    179 192     CMP:   Recent Labs   Lab 08/25/20  0915 08/26/20  0643    137   K 3.8 3.9    106   CO2 19* 20*   GLU 84 77   BUN 9 7   CREATININE 0.7 0.6   CALCIUM 9.0 9.3   PROT 7.3 6.9   ALBUMIN 3.3* 3.1*   BILITOT 0.7 0.9   ALKPHOS 85 81   AST 18 17   ALT 12 12   ANIONGAP 13 11   EGFRNONAA >60 >60     Coagulation:   Recent Labs   Lab 08/25/20  0923  08/26/20  0643   INR 0.9  --   --    APTT 27.5   < > 45.0*    < > = values in this interval not displayed.     All pertinent labs within the past 24 hours have been reviewed.    Significant Imaging: I have reviewed all pertinent imaging results/findings within the past 24 hours.      Assessment/Plan:      * Bilateral pulmonary embolism  H/O sagittal sinus thrombosis s/p coumadin   Hematology consulted   Started on heparin  No sign of right heart strain   Most likely will need life long  OAC   8/26/20  -Continue heparin gtt for 24 hours   -Lovenox on discharge          First trimester pregnancy  OB/GYN consulted   Ob care established with Dr Ballesteros at [facility] and plans follow up with her.              VTE Risk Mitigation (From admission, onward)         Ordered     IP VTE HIGH RISK PATIENT  Once      08/25/20 1843     Place sequential compression device  Until discontinued      08/25/20 1843     heparin 25,000 units in dextrose 5% 250  mL (100 units/mL) infusion HIGH INTENSITY nomogram - [facility]  Continuous     Question:  Heparin Infusion Adjustment (DO NOT MODIFY ANSWER)  Answer:  \\[email or web address]\epic\Images\Pharmacy\HeparinInfusions\heparin HIGH INTENSITY nomogram for [facility] [id number]    08/25/20 1259     heparin 25,000 units in dextrose 5% (100 units/ml) IV bolus from bag - ADDITIONAL PRN BOLUS - 60 units/kg  As needed (PRN)     Question:  Heparin Infusion Adjustment (DO NOT MODIFY ANSWER)  Answer:  [email or web address]\epic\Images\Pharmacy\HeparinInfusions\heparin HIGH INTENSITY nomogram for [facility] [id number]    08/25/20 1259     heparin 25,000 units in dextrose 5% (100 units/ml) IV bolus from bag - ADDITIONAL PRN BOLUS - 30 units/kg  As needed (PRN)     Question:  Heparin Infusion Adjustment (DO NOT MODIFY ANSWER)  Answer:  \\[email or web address]\epic\Images\Pharmacy\HeparinInfusions\heparin HIGH INTENSITY nomogram for [facility] [id number].pdf    08/25/20 1259                Discharge Planning   JOSE:      Code Status: Full Code   Is the patient medically ready for discharge?:     Reason for patient still in hospital (select all that apply): Treatment  Discharge Plan A: Home with family                  Jamee Ribera NP  Department of Hospital Medicine   [facility]    Bcc Infiltrative Histology Text: There were numerous aggregates of basaloid cells demonstrating an infiltrative pattern.

## 2020-08-26 NOTE — PLAN OF CARE
Pt had no adverse events during shift. Pt free of falls. Call light in reach. Side rails x 2. Heparin drip administered per orders. Pt repositions independently. TERE ANDREW. Safety promoted. Chart reviewed, will continue to monitor.

## 2020-08-26 NOTE — ASSESSMENT & PLAN NOTE
OB/GYN consulted   Ob care established with Dr Ballesteros at [facility] and plans follow up with her.

## 2020-08-26 NOTE — PROGRESS NOTES
"[facility]  Hematology/Oncology  Progress Note    Patient Name: Jo-Ann Ochoa  Admission Date: 8/25/2020  Hospital Length of Stay: 1 days  Code Status: Full Code     Subjective:     HPI:  35-year-old female 11 weeks pregnant patient presented with hemoptysis found to have bilateral pulmonary emboli.  Patient in 2017 had cerebral sinus thrombosis treated with heparin and warfarin 3 months after pregnancy discontinued.  Did not recommend any further thrombosis prevention.  Patient did have hypercoagulable workup reviewed in Care everywhere which was negative.  Patient now presents with bilateral pulmonary emboli hemoptysis was asked to see patient for further evaluation    Interval History: Remains on heparin gtt. Reports improvement in symptoms. Reports hemoptysis x 1 this am with "streak of blood". Reports resolution of CP. Denies any bleeding    Oncology Treatment Plan:   [No treatment plan]    Medications:  Continuous Infusions:   heparin (porcine) in D5W 18 Units/kg/hr (08/25/20 0428)     Scheduled Meds:   melatonin  9 mg Oral QHS     PRN Meds:acetaminophen, heparin (PORCINE), heparin (PORCINE), HYDROcodone-acetaminophen, ondansetron, sodium chloride 0.9%     Review of Systems   Constitutional: Negative for activity change, appetite change, chills, diaphoresis, fatigue, fever and unexpected weight change.   HENT: Negative for congestion, mouth sores, nosebleeds, sore throat, trouble swallowing and voice change.         Hemoptysis x 1 this am   Eyes: Negative for photophobia and visual disturbance.   Respiratory: Negative for cough, chest tightness, shortness of breath and wheezing.    Cardiovascular: Negative for chest pain, palpitations and leg swelling.   Gastrointestinal: Negative for abdominal distention, abdominal pain, anal bleeding, blood in stool, constipation, diarrhea, nausea and vomiting.   Genitourinary: Negative for difficulty urinating, dysuria and hematuria. "   Musculoskeletal: Negative for arthralgias, back pain and myalgias.   Skin: Negative for pallor, rash and wound.   Neurological: Negative for dizziness, syncope, weakness and headaches.   Hematological: Negative for adenopathy. Does not bruise/bleed easily.   Psychiatric/Behavioral: The patient is not nervous/anxious.      Objective:     Vital Signs (Most Recent):  Temp: 97.4 °F (36.3 °C) (08/26/20 0730)  Pulse: 95 (08/26/20 0730)  Resp: 17 (08/26/20 0730)  BP: (!) 118/56 (08/26/20 0730)  SpO2: 95 % (08/26/20 0730) Vital Signs (24h Range):  Temp:  [97.4 °F (36.3 °C)-98.8 °F (37.1 °C)] 97.4 °F (36.3 °C)  Pulse:  [65-98] 95  Resp:  [16-18] 17  SpO2:  [95 %-99 %] 95 %  BP: (109-133)/(53-77) 118/56     Weight: 72.1 kg (158 lb 15.2 oz)  Body mass index is 24.9 kg/m².  Body surface area is 1.85 meters squared.      Intake/Output Summary (Last 24 hours) at 8/26/2020 1124  Last data filed at 8/25/2020 2012  Gross per 24 hour   Intake 240 ml   Output --   Net 240 ml       Physical Exam  Vitals signs reviewed.   Constitutional:       Appearance: She is well-developed.   HENT:      Head: Normocephalic.      Right Ear: External ear normal.      Left Ear: External ear normal.      Nose: Nose normal.   Eyes:      General: Lids are normal. No scleral icterus.        Right eye: No discharge.         Left eye: No discharge.      Conjunctiva/sclera: Conjunctivae normal.   Neck:      Musculoskeletal: Normal range of motion.      Thyroid: No thyroid mass.   Cardiovascular:      Rate and Rhythm: Normal rate and regular rhythm.      Heart sounds: Normal heart sounds. No murmur.   Pulmonary:      Effort: Pulmonary effort is normal. No respiratory distress.      Breath sounds: Normal breath sounds. No wheezing, rhonchi or rales.   Abdominal:      General: Bowel sounds are normal. There is no distension.      Palpations: Abdomen is soft.      Tenderness: There is no abdominal tenderness.   Genitourinary:     Comments:  deferred  Musculoskeletal: Normal range of motion.   Skin:     General: Skin is warm and dry.   Neurological:      Mental Status: She is alert and oriented to person, place, and time.   Psychiatric:         Speech: Speech normal.         Behavior: Behavior normal. Behavior is cooperative.         Thought Content: Thought content normal.         Significant Labs:   BMP:   Recent Labs   Lab 08/25/20  0915 08/26/20  0643   GLU 84 77    137   K 3.8 3.9    106   CO2 19* 20*   BUN 9 7   CREATININE 0.7 0.6   CALCIUM 9.0 9.3   , CBC:   Recent Labs   Lab 08/25/20  0915 08/25/20  1315 08/26/20  0643   WBC 10.26 12.26 9.91   HGB 15.2 14.2 14.1   HCT 45.5 41.9 42.6    179 192   , LFTs:   Recent Labs   Lab 08/25/20  0915 08/26/20  0643   ALT 12 12   AST 18 17   ALKPHOS 85 81   BILITOT 0.7 0.9   PROT 7.3 6.9   ALBUMIN 3.3* 3.1*    and All pertinent labs from the last 24 hours have been reviewed.    Diagnostic Results:  I have reviewed all pertinent imaging results/findings within the past 24 hours.    Assessment/Plan:     * Bilateral pulmonary embolism  Reviewed imaging personally with  and patient in bed demonstrating bilateral pulmonary emboli.  Reviewed note from Care everywhere.  At this point concur that treatment with intravenous heparin is a reasonable course of action she did present with hemoptysis today using intravenous heparin will allow better control if hemoptysis recurs in terms of discontinuation reversal.  Once patient is medically stabilized will probably convert to low-molecular heparin 1 milligram/kilogram twice a day throughout pregnancy.  She has had 2 thrombotic events I would recommend lifelong anticoagulation at this time.  Once completion of pregnancy discussed implications with her answered questions article from up-to-date followed to her on treatment    8/26/20  --Laboratory review unremarkable   --Continue heparin gtt. Monitor hemoptysis and other S&S bleeding overnight. If  remains stable transition to Lovenox 1mg/kg injections BID in am. Will arrange outpatient follow up with Hematology/Oncology for further evaluation/management. Will plan to transition to lifelong oral anticoagulation postpartum        Thank you for your consult. I will follow-up with patient. Please contact us if you have any additional questions.     Emily Jose NP  Hematology/Oncology  [facility]

## 2020-08-26 NOTE — SUBJECTIVE & OBJECTIVE
Interval History: Pt lying in bed, no acute distress noted. Heparin gtt infusing. Continue current treatment plan.     Review of Systems   Constitutional: Negative.  Negative for chills.   HENT: Negative.    Eyes: Negative.    Respiratory: Negative.         Hemoptysis    Cardiovascular: Positive for chest pain.   Gastrointestinal: Negative.    Endocrine: Negative.    Genitourinary: Negative.    Musculoskeletal: Negative.    Allergic/Immunologic: Negative.    Neurological: Negative.    Hematological: Negative.    Psychiatric/Behavioral: Negative.      Objective:     Vital Signs (Most Recent):  Temp: 97.4 °F (36.3 °C) (08/26/20 0730)  Pulse: 95 (08/26/20 0730)  Resp: 17 (08/26/20 0730)  BP: (!) 118/56 (08/26/20 0730)  SpO2: 95 % (08/26/20 0730) Vital Signs (24h Range):  Temp:  [97.4 °F (36.3 °C)-98.8 °F (37.1 °C)] 97.4 °F (36.3 °C)  Pulse:  [65-98] 95  Resp:  [16-18] 17  SpO2:  [95 %-99 %] 95 %  BP: (109-133)/(53-77) 118/56     Weight: 72.1 kg (158 lb 15.2 oz)  Body mass index is 24.9 kg/m².    Intake/Output Summary (Last 24 hours) at 8/26/2020 1116  Last data filed at 8/25/2020 2012  Gross per 24 hour   Intake 240 ml   Output --   Net 240 ml      Physical Exam  Constitutional:       General: She is not in acute distress.     Appearance: Normal appearance. She is not ill-appearing.   HENT:      Head: Normocephalic and atraumatic.      Nose: Nose normal. No congestion or rhinorrhea.      Mouth/Throat:      Mouth: Mucous membranes are moist.   Eyes:      Extraocular Movements: Extraocular movements intact.      Pupils: Pupils are equal, round, and reactive to light.   Neck:      Musculoskeletal: Normal range of motion and neck supple. No neck rigidity or muscular tenderness.   Cardiovascular:      Rate and Rhythm: Regular rhythm.      Pulses: Normal pulses.      Heart sounds: Normal heart sounds. No murmur.   Pulmonary:      Effort: Pulmonary effort is normal. No respiratory distress.      Breath sounds: No stridor. No  wheezing or rhonchi.   Abdominal:      General: Abdomen is flat. There is no distension.      Palpations: Abdomen is soft. There is no mass.      Tenderness: There is no abdominal tenderness.      Hernia: No hernia is present.   Musculoskeletal: Normal range of motion.         General: No swelling, tenderness, deformity or signs of injury.   Skin:     General: Skin is warm.      Coloration: Skin is not jaundiced or pale.   Neurological:      General: No focal deficit present.      Mental Status: She is alert and oriented to person, place, and time.      Cranial Nerves: No cranial nerve deficit.      Sensory: No sensory deficit.   Psychiatric:         Mood and Affect: Mood normal.         Behavior: Behavior normal.         Significant Labs:   BMP:   Recent Labs   Lab 08/26/20  0643   GLU 77      K 3.9      CO2 20*   BUN 7   CREATININE 0.6   CALCIUM 9.3     CBC:   Recent Labs   Lab 08/25/20  0915 08/25/20  1315 08/26/20  0643   WBC 10.26 12.26 9.91   HGB 15.2 14.2 14.1   HCT 45.5 41.9 42.6    179 192     CMP:   Recent Labs   Lab 08/25/20  0915 08/26/20  0643    137   K 3.8 3.9    106   CO2 19* 20*   GLU 84 77   BUN 9 7   CREATININE 0.7 0.6   CALCIUM 9.0 9.3   PROT 7.3 6.9   ALBUMIN 3.3* 3.1*   BILITOT 0.7 0.9   ALKPHOS 85 81   AST 18 17   ALT 12 12   ANIONGAP 13 11   EGFRNONAA >60 >60     Coagulation:   Recent Labs   Lab 08/25/20  0923  08/26/20  0643   INR 0.9  --   --    APTT 27.5   < > 45.0*    < > = values in this interval not displayed.     All pertinent labs within the past 24 hours have been reviewed.    Significant Imaging: I have reviewed all pertinent imaging results/findings within the past 24 hours.

## 2020-08-26 NOTE — SUBJECTIVE & OBJECTIVE
Obstetric HPI:  Patient reports None contractions, too early for fetal movement, absent vaginal bleeding , absent loss of fluid      Objective:     Vital Signs (Most Recent):  Temp: 97.6 °F (36.4 °C) (08/25/20 1918)  Pulse: 83 (08/25/20 1918)  Resp: 18 (08/25/20 1918)  BP: (!) 122/58 (08/25/20 1918)  SpO2: 96 % (08/25/20 1918) Vital Signs (24h Range):  Temp:  [97.6 °F (36.4 °C)-98.7 °F (37.1 °C)] 97.6 °F (36.4 °C)  Pulse:  [] 83  Resp:  [12-21] 18  SpO2:  [96 %-100 %] 96 %  BP: (113-135)/(56-79) 122/58     Weight: 72.1 kg (158 lb 15.2 oz)  Body mass index is 24.9 kg/m².    FHT: too early for doppler  No intake or output data in the 24 hours ending 08/25/20 2146    Cervix deferred     Significant Labs:  Recent Lab Results       08/25/20  1315   08/25/20  0923   08/25/20  0915        Albumin     3.3     Alkaline Phosphatase     85     ALT     12     Anion Gap     13     aPTT   27.5  Comment:  aPTT therapeutic range = 39-69 seconds       AST     18     Baso # 0.02   0.02     Basophil% 0.2   0.2     BILIRUBIN TOTAL     0.7  Comment:  For infants and newborns, interpretation of results should be based  on gestational age, weight and in agreement with clinical  observations.  Premature Infant recommended reference ranges:  Up to 24 hours.............<8.0 mg/dL  Up to 48 hours............<12.0 mg/dL  3-5 days..................<15.0 mg/dL  6-29 days.................<15.0 mg/dL       BNP     41  Comment:  Values of less than 100 pg/ml are consistent with non-CHF populations.     BUN, Bld     9     Calcium     9.0     Chloride     104     CO2     19     Creatinine     0.7     D-Dimer   2.10  Comment:  The quantitative D-dimer assay should be used as an aid in   the diagnosis of deep vein thrombosis and pulmonary embolism  in patients with the appropriate presentation and clinical  history. The upper limit of the reference interval and the clinical   cut off   point are identical. Causes of a positive (>0.50 mg/L FEU)  D-Dimer   test  include, but are not limited to: DVT, PE, DIC, thrombolytic   therapy, anticoagulant therapy, recent surgery, trauma, or   pregnancy, disseminated malignancy, aortic aneurysm, cirrhosis,  and severe infection. False negative results may occur in   patients with distal DVT.         Differential Method Automated   Automated     eGFR if      >60     eGFR if non      >60  Comment:  Calculation used to obtain the estimated glomerular filtration  rate (eGFR) is the CKD-EPI equation.        Eos # 0.1   0.1     Eosinophil% 0.4   0.8     Glucose     84     Gran # (ANC) 10.0   8.2     Gran% 81.7   80.3     Hematocrit 41.9   45.5     Hemoglobin 14.2   15.2     Immature Grans (Abs) 0.04  Comment:  Mild elevation in immature granulocytes is non specific and   can be seen in a variety of conditions including stress response,   acute inflammation, trauma and pregnancy. Correlation with other   laboratory and clinical findings is essential.     0.05  Comment:  Mild elevation in immature granulocytes is non specific and   can be seen in a variety of conditions including stress response,   acute inflammation, trauma and pregnancy. Correlation with other   laboratory and clinical findings is essential.       Immature Granulocytes 0.3   0.5     INR   0.9  Comment:  Coumadin Therapy:  2.0 - 3.0 for INR for all indicators except mechanical heart valves  and antiphospholipid syndromes which should use 2.5 - 3.5.         Lymph # 1.7   1.5     Lymph% 14.0   14.2     MCH 31.1   31.3     MCHC 33.9   33.4     MCV 92   94     Mono # 0.4   0.4     Mono% 3.4   4.0     MPV 10.7   10.6     nRBC 0   0     Platelets 179   179     Potassium     3.8     PROTEIN TOTAL     7.3     Protime   10.1       RBC 4.57   4.85     RDW 12.9   13.1     SARS-CoV-2 RNA, Amplification, Qual   Negative  Comment:  This test utilizes isothermal nucleic acid amplification   technology to detect the SARS-CoV-2 RdRp nucleic  acid segment.   The analytical sensitivity (limit of detection) is 125 genome   equivalents/mL.   A POSITIVE result implies infection with the SARS-CoV-2 virus;  the patient is presumed to be contagious.    A NEGATIVE result means that SARS-CoV-2 nucleic acids are not  present above the limit of detection. A NEGATIVE result should be   treated as presumptive. It does not rule out the possibility of   COVID-19 and should not be the sole basis for treatment decisions.   If COVID-19 is strongly suspected based on clinical and exposure   history, re-testing using an alternate molecular assay should be   considered.   This test is only for use under the Food and Drug   Administration s Emergency Use Authorization (EUA).   Commercial kits are provided by [employer].   Performance characteristics of the EUA have been independently  verified by [facility] Department of  Pathology and Laboratory Medicine.   _________________________________________________________________  The ID NOW COVID-19 Letter of Authorization, along with the   authorized Fact Sheet for Healthcare Providers, the authorized Fact  Sheet for Patients, and authorized labeling are available on the FDA   website:  www.fda.gov/MedicalDevices/Safety/EmergencySituations/zfw376332.htm         Sodium     136     Troponin I     <0.006  Comment:  The reference interval for Troponin I represents the 99th percentile   cutoff   for our facility and is consistent with 3rd generation assay   performance.       WBC 12.26   10.26           Physical Exam:   Constitutional: She appears well-developed.     Eyes: Pupils are equal, round, and reactive to light. Conjunctivae and EOM are normal.    Neck: Normal range of motion. Neck supple.     Pulmonary/Chest: Effort normal.        Abdominal: Soft. Bowel sounds are normal. She exhibits no abdominal incision. There is no abdominal tenderness.             Musculoskeletal: Normal range of motion.        Neurological: She is alert.    Skin: Skin is warm.    Psychiatric: She has a normal mood and affect.

## 2020-08-26 NOTE — ASSESSMENT & PLAN NOTE
Ob care established with Dr Ballesteros at [facility] and plans follow up with her.  No ob issues  Aware of safety of heparin and lovenox in pregnancy  Will get ob sono in am to confirm viability

## 2020-08-27 VITALS
SYSTOLIC BLOOD PRESSURE: 124 MMHG | BODY MASS INDEX: 24.94 KG/M2 | TEMPERATURE: 98 F | OXYGEN SATURATION: 97 % | HEIGHT: 67 IN | HEART RATE: 83 BPM | WEIGHT: 158.94 LBS | DIASTOLIC BLOOD PRESSURE: 58 MMHG | RESPIRATION RATE: 17 BRPM

## 2020-08-27 LAB
APTT BLDCRRT: 41.2 SEC (ref 21–32)
BASOPHILS # BLD AUTO: 0.02 K/UL (ref 0–0.2)
BASOPHILS NFR BLD: 0.2 % (ref 0–1.9)
DIFFERENTIAL METHOD: ABNORMAL
EOSINOPHIL # BLD AUTO: 0.1 K/UL (ref 0–0.5)
EOSINOPHIL NFR BLD: 1.4 % (ref 0–8)
ERYTHROCYTE [DISTWIDTH] IN BLOOD BY AUTOMATED COUNT: 13.3 % (ref 11.5–14.5)
HCT VFR BLD AUTO: 44.5 % (ref 37–48.5)
HGB BLD-MCNC: 14.3 G/DL (ref 12–16)
IMM GRANULOCYTES # BLD AUTO: 0.07 K/UL (ref 0–0.04)
IMM GRANULOCYTES NFR BLD AUTO: 0.8 % (ref 0–0.5)
LYMPHOCYTES # BLD AUTO: 1.8 K/UL (ref 1–4.8)
LYMPHOCYTES NFR BLD: 20 % (ref 18–48)
MCH RBC QN AUTO: 30.8 PG (ref 27–31)
MCHC RBC AUTO-ENTMCNC: 32.1 G/DL (ref 32–36)
MCV RBC AUTO: 96 FL (ref 82–98)
MONOCYTES # BLD AUTO: 0.4 K/UL (ref 0.3–1)
MONOCYTES NFR BLD: 4.6 % (ref 4–15)
NEUTROPHILS # BLD AUTO: 6.6 K/UL (ref 1.8–7.7)
NEUTROPHILS NFR BLD: 73 % (ref 38–73)
NRBC BLD-RTO: 0 /100 WBC
PLATELET # BLD AUTO: 189 K/UL (ref 150–350)
PMV BLD AUTO: 11.8 FL (ref 9.2–12.9)
RBC # BLD AUTO: 4.65 M/UL (ref 4–5.4)
WBC # BLD AUTO: 9.05 K/UL (ref 3.9–12.7)

## 2020-08-27 PROCEDURE — 99232 PR SUBSEQUENT HOSPITAL CARE,LEVL II: ICD-10-PCS | Mod: ,,, | Performed by: INTERNAL MEDICINE

## 2020-08-27 PROCEDURE — 36415 COLL VENOUS BLD VENIPUNCTURE: CPT

## 2020-08-27 PROCEDURE — 85730 THROMBOPLASTIN TIME PARTIAL: CPT

## 2020-08-27 PROCEDURE — 99231 PR SUBSEQUENT HOSPITAL CARE,LEVL I: ICD-10-PCS | Mod: ,,, | Performed by: OBSTETRICS & GYNECOLOGY

## 2020-08-27 PROCEDURE — 85025 COMPLETE CBC W/AUTO DIFF WBC: CPT

## 2020-08-27 PROCEDURE — 99232 SBSQ HOSP IP/OBS MODERATE 35: CPT | Mod: ,,, | Performed by: INTERNAL MEDICINE

## 2020-08-27 PROCEDURE — 99231 SBSQ HOSP IP/OBS SF/LOW 25: CPT | Mod: ,,, | Performed by: OBSTETRICS & GYNECOLOGY

## 2020-08-27 RX ORDER — ENOXAPARIN SODIUM 100 MG/ML
1 INJECTION SUBCUTANEOUS 2 TIMES DAILY
Qty: 48 ML | Refills: 0 | Status: SHIPPED | OUTPATIENT
Start: 2020-08-27 | End: 2020-09-15 | Stop reason: SDUPTHER

## 2020-08-27 NOTE — DISCHARGE SUMMARY
[facility] Hospital Medicine  Discharge Summary      Patient Name: Jo-Ann Ochoa  MRN: [id number]  Admission Date: 8/25/2020  Hospital Length of Stay: 2 days  Discharge Date and Time:  08/27/2020 12:08 PM  Attending Physician: No att. providers found   Discharging Provider: Jamee Ribera NP  Primary Care Provider: Diomedes Starks MD      HPI:   36 y/o wf with a PMHx of superior sagittal sinus thrombus s/p coumadin x 3 months  And   11 week pregnancy presented to the ER with a C/O  Hemoptysis  Since yesterday . She report  Had one episode of hemoptysis yesterday  . It is associated with a chest pain which worse with inspiration ( started today ). She denies  Any fever , chills , sob  , recent travel , LE swelling , abdominal pain , nausea , vomit  , diarrhea or  sx .  She was in her usual state of health before yesterday .   ER COURSE :  D-dimer 2 , CXR show no acute finding , B/L LE US  Negative for DVT  , CTA Chest show B/L PE .The ER Doctor Discussed the case with hematology which recommend heparin drip . The case was discussed with IR which did not recommend  EKKOs at this time   ER VS  Initial Vitals [08/25/20 0852]   BP Pulse Resp Temp SpO2   135/79 (!) 114 20 98.7 °F (37.1 °C) 97 %         Pt will be admitted  To inpatient with a Dx of acute B/L PE  And First trimester pregnancy       * No surgery found *      Hospital Course:   36 y/o female admitted for bilateral pulmonary embolism. Patient being treated with heparin gtt. Hematology following. No evidence of right heart strain per CTA chest. As of 8/27 symptoms improved. No hemoptysis overnight. H/H stable. Heparin gtt discontinued. Will discharge on low-molecular heparin 1 milligram/kilogram twice a day per hematology recommendation. Patient to follow-up with hematology in 2 weeks for any additional hypercoagulable workup. Patient also to follow-up with PCP and OB/GYN outpatient. Patient seen and examined on the date of  discharge and suitable for discharge.      Consults:   Consults (From admission, onward)        Status Ordering Provider     Inpatient consult to Hematology  Once     Provider:  Chad Branham MD    Acknowledged ADIN COLIN JR     Inpatient consult to Obstetrics / Gynecology  Once     Provider:  (Not yet assigned)    Acknowledged ADIN COLIN JR          No new Assessment & Plan notes have been filed under this hospital service since the last note was generated.  Service: Hospital Medicine    Final Active Diagnoses:    Diagnosis Date Noted POA    PRINCIPAL PROBLEM:  Bilateral pulmonary embolism [I26.99] 08/25/2020 Yes    First trimester pregnancy [Z34.91] 08/25/2020 Not Applicable      Problems Resolved During this Admission:       Discharged Condition: stable    Disposition: Home or Self Care    Follow Up:  Follow-up Information     Diomedes Starks MD In 3 days.    Specialty: Internal Medicine  Why: for hospital follow-up   Contact information:  [address]   [place] [address]  [phone]             Chad Branham MD In 1 week.    Specialty: Hematology and Oncology  Why: for hospital follow-up of bilateral PE   Contact information:  [id number]  [place] [address]  [phone]                 Patient Instructions:      Notify your health care provider if you experience any of the following:  persistent nausea and vomiting or diarrhea     Notify your health care provider if you experience any of the following:  severe uncontrolled pain     Notify your health care provider if you experience any of the following:  difficulty breathing or increased cough     Notify your health care provider if you experience any of the following:  severe persistent headache     Notify your health care provider if you experience any of the following:  persistent dizziness, light-headedness, or visual disturbances     Notify your health care provider if you experience any of the following:  increased  confusion or weakness     Activity as tolerated       Significant Diagnostic Studies: Labs:   BMP:   Recent Labs   Lab 08/26/20  0643   GLU 77      K 3.9      CO2 20*   BUN 7   CREATININE 0.6   CALCIUM 9.3   , CMP   Recent Labs   Lab 08/26/20  0643      K 3.9      CO2 20*   GLU 77   BUN 7   CREATININE 0.6   CALCIUM 9.3   PROT 6.9   ALBUMIN 3.1*   BILITOT 0.9   ALKPHOS 81   AST 17   ALT 12   ANIONGAP 11   ESTGFRAFRICA >60   EGFRNONAA >60    and CBC   Recent Labs   Lab 08/25/20  1315 08/26/20  0643 08/27/20  0609   WBC 12.26 9.91 9.05   HGB 14.2 14.1 14.3   HCT 41.9 42.6 44.5    192 189       Pending Diagnostic Studies:     None         Medications:  Reconciled Home Medications:      Medication List      START taking these medications    enoxaparin 80 mg/0.8 mL Syrg  Commonly known as: LOVENOX  Inject 0.7 mLs (70 mg total) into the skin 2 (two) times a day.        CONTINUE taking these medications    melatonin 10 mg Tab  Take 10 mg by mouth every evening.        STOP taking these medications    cetirizine 10 MG tablet  Commonly known as: ZYRTEC     levonorgestrel-ethinyl estradiol 0.15 mg-30 mcg (91) per tablet  Commonly known as: SEASONALE            Indwelling Lines/Drains at time of discharge:   Lines/Drains/Airways     None                 Time spent on the discharge of patient: 61 minutes  Patient was seen and examined on the date of discharge and determined to be suitable for discharge.         Jamee Ribera NP  Department of Hospital Medicine  [facility]

## 2020-08-27 NOTE — PROGRESS NOTES
[facility]   Hematology/Oncology  Progress Note    Patient Name: Jo-Ann Ochoa  Admission Date: 8/25/2020  Hospital Length of Stay: 2 days  Code Status: Full Code     Subjective:     HPI:  35-year-old female 11 weeks pregnant patient presented with hemoptysis found to have bilateral pulmonary emboli.  Patient in 2017 had cerebral sinus thrombosis treated with heparin and warfarin 3 months after pregnancy discontinued.  Did not recommend any further thrombosis prevention.  Patient did have hypercoagulable workup reviewed in Care everywhere which was negative.  Patient now presents with bilateral pulmonary emboli hemoptysis was asked to see patient for further evaluation    Interval History: No acute events overnight. No further hemoptysis per patient    Oncology Treatment Plan:   [No treatment plan]    Medications:  Continuous Infusions:   heparin (porcine) in D5W 18 Units/kg/hr (08/26/20 1222)     Scheduled Meds:   melatonin  9 mg Oral QHS     PRN Meds:acetaminophen, heparin (PORCINE), heparin (PORCINE), HYDROcodone-acetaminophen, ondansetron, sodium chloride 0.9%     Review of Systems   Constitutional: Negative for activity change, appetite change, chills, diaphoresis, fatigue, fever and unexpected weight change.   HENT: Negative for congestion, mouth sores, nosebleeds, sore throat, trouble swallowing and voice change.    Eyes: Negative for photophobia and visual disturbance.   Respiratory: Negative for cough, chest tightness, shortness of breath and wheezing.    Cardiovascular: Negative for chest pain, palpitations and leg swelling.   Gastrointestinal: Negative for abdominal distention, abdominal pain, anal bleeding, blood in stool, constipation, diarrhea, nausea and vomiting.   Genitourinary: Negative for difficulty urinating, dysuria and hematuria.   Musculoskeletal: Negative for arthralgias, back pain and myalgias.   Skin: Negative for pallor, rash and wound.   Neurological: Negative for  dizziness, syncope, weakness and headaches.   Hematological: Negative for adenopathy. Does not bruise/bleed easily.   Psychiatric/Behavioral: The patient is not nervous/anxious.      Objective:     Vital Signs (Most Recent):  Temp: 98.3 °F (36.8 °C) (08/27/20 0720)  Pulse: 83 (08/27/20 0720)  Resp: 17 (08/27/20 0720)  BP: (!) 124/58 (08/27/20 0720)  SpO2: 97 % (08/27/20 0720) Vital Signs (24h Range):  Temp:  [97.4 °F (36.3 °C)-98.4 °F (36.9 °C)] 98.3 °F (36.8 °C)  Pulse:  [67-89] 83  Resp:  [17-18] 17  SpO2:  [93 %-97 %] 97 %  BP: (100-124)/(53-61) 124/58     Weight: 72.1 kg (158 lb 15.2 oz)  Body mass index is 24.9 kg/m².  Body surface area is 1.85 meters squared.      Intake/Output Summary (Last 24 hours) at 8/27/2020 1046  Last data filed at 8/27/2020 0800  Gross per 24 hour   Intake 395.73 ml   Output --   Net 395.73 ml       Physical Exam  Vitals signs reviewed.   Constitutional:       Appearance: She is well-developed.   HENT:      Head: Normocephalic.      Right Ear: External ear normal.      Left Ear: External ear normal.      Nose: Nose normal.   Eyes:      General: Lids are normal. No scleral icterus.        Right eye: No discharge.         Left eye: No discharge.      Conjunctiva/sclera: Conjunctivae normal.   Neck:      Musculoskeletal: Normal range of motion.      Thyroid: No thyroid mass.   Cardiovascular:      Rate and Rhythm: Normal rate and regular rhythm.      Heart sounds: Normal heart sounds. No murmur.   Pulmonary:      Effort: Pulmonary effort is normal. No respiratory distress.      Breath sounds: Normal breath sounds. No wheezing, rhonchi or rales.   Abdominal:      General: Bowel sounds are normal. There is no distension.      Palpations: Abdomen is soft.      Tenderness: There is no abdominal tenderness.   Genitourinary:     Comments: deferred  Musculoskeletal: Normal range of motion.   Skin:     General: Skin is warm and dry.   Neurological:      Mental Status: She is alert and oriented to  person, place, and time.   Psychiatric:         Speech: Speech normal.         Behavior: Behavior normal. Behavior is cooperative.         Thought Content: Thought content normal.         Significant Labs:   BMP:   Recent Labs   Lab 08/26/20  0643   GLU 77      K 3.9      CO2 20*   BUN 7   CREATININE 0.6   CALCIUM 9.3   , CBC:   Recent Labs   Lab 08/25/20  1315 08/26/20  0643 08/27/20  0609   WBC 12.26 9.91 9.05   HGB 14.2 14.1 14.3   HCT 41.9 42.6 44.5    192 189   , LFTs:   Recent Labs   Lab 08/26/20  0643   ALT 12   AST 17   ALKPHOS 81   BILITOT 0.9   PROT 6.9   ALBUMIN 3.1*    and All pertinent labs from the last 24 hours have been reviewed.    Diagnostic Results:  I have reviewed all pertinent imaging results/findings within the past 24 hours.    Assessment/Plan:     * Bilateral pulmonary embolism  Reviewed imaging personally with  and patient in bed demonstrating bilateral pulmonary emboli.  Reviewed note from Care everywhere.  At this point concur that treatment with intravenous heparin is a reasonable course of action she did present with hemoptysis today using intravenous heparin will allow better control if hemoptysis recurs in terms of discontinuation reversal.  Once patient is medically stabilized will probably convert to low-molecular heparin 1 milligram/kilogram twice a day throughout pregnancy.  She has had 2 thrombotic events I would recommend lifelong anticoagulation at this time.  Once completion of pregnancy discussed implications with her answered questions article from up-to-date followed to her on treatment    8/27/20  --Laboratory review unremarkable   --transition to Lovenox 1mg/kg BID. Will arrange outpatient follow up with Hematology/Oncology for further evaluation/management. Will plan to transition to lifelong oral anticoagulation postpartum        Thank you for your consult. I will follow-up with patient. Please contact us if you have any additional questions.      Emily Jose NP  Hematology/Oncology  [facility]

## 2020-08-27 NOTE — ASSESSMENT & PLAN NOTE
Reviewed imaging personally with  and patient in bed demonstrating bilateral pulmonary emboli.  Reviewed note from Care everywhere.  At this point concur that treatment with intravenous heparin is a reasonable course of action she did present with hemoptysis today using intravenous heparin will allow better control if hemoptysis recurs in terms of discontinuation reversal.  Once patient is medically stabilized will probably convert to low-molecular heparin 1 milligram/kilogram twice a day throughout pregnancy.  She has had 2 thrombotic events I would recommend lifelong anticoagulation at this time.  Once completion of pregnancy discussed implications with her answered questions article from up-to-date followed to her on treatment    8/27/20  --Laboratory review unremarkable   --transition to Lovenox 1mg/kg BID. Will arrange outpatient follow up with Hematology/Oncology for further evaluation/management. Will plan to transition to lifelong oral anticoagulation postpartum

## 2020-08-27 NOTE — PROGRESS NOTES
[facility]   Obstetrics  Antepartum Progress Note    Patient Name: Jo-Ann Ochoa  MRN: [id number]  Admission Date: 8/25/2020  Hospital Length of Stay: 2 days  Attending Physician: Gato Espinoza, *  Primary Care Provider: Diomedes Starks MD    Subjective:     Principal Problem:Bilateral pulmonary embolism    HPI:    34 y/o g1po at 11 wks per pt history with chest tightness and hemoptysis.  Patient with hx of dvt 3 yrs prior.  Has established OB care with Dr Ballesteros () and was advised to take daily aspirn    Hospital Course:  08/25/2020-bilateral PE. Patient was initiated on heparin gtt as per Hospital Medicine. She will be transitioned to Lovenox prior to discharge and will remain on this for duration of pregnancy.      Obstetric HPI:  Patient reports no cramping or vaginal bleeding. She reports persistent cough but now coughing up clear nonbloody sputum. Denies SOB unless exerting herself. No fever. +nausea but no emesis and is tolerating po well      Objective:     Vital Signs (Most Recent):  Temp: 98.3 °F (36.8 °C) (08/27/20 0720)  Pulse: 83 (08/27/20 0720)  Resp: 17 (08/27/20 0720)  BP: (!) 124/58 (08/27/20 0720)  SpO2: 97 % (08/27/20 0720) Vital Signs (24h Range):  Temp:  [97.4 °F (36.3 °C)-98.4 °F (36.9 °C)] 98.3 °F (36.8 °C)  Pulse:  [67-89] 83  Resp:  [17-18] 17  SpO2:  [93 %-97 %] 97 %  BP: (100-124)/(53-61) 124/58     Weight: 72.1 kg (158 lb 15.2 oz)  Body mass index is 24.9 kg/m².        Intake/Output Summary (Last 24 hours) at 8/27/2020 0851  Last data filed at 8/27/2020 0800  Gross per 24 hour   Intake 395.73 ml   Output --   Net 395.73 ml       Significant Labs:  Recent Lab Results       08/27/20  0609        aPTT 41.2  Comment:  aPTT therapeutic range = 39-69 seconds           Physical Exam:   Constitutional: She is oriented to person, place, and time. She appears well-developed and well-nourished. No distress.       Cardiovascular: Normal rate, regular rhythm and normal  heart sounds.    No murmur heard.   Pulmonary/Chest: Effort normal and breath sounds normal. No respiratory distress. She has no wheezes. She has no rales.        Abdominal: Soft. Bowel sounds are normal. She exhibits no distension. There is no abdominal tenderness. There is no guarding.             Musculoskeletal: No edema.       Neurological: She is alert and oriented to person, place, and time.    Skin: Skin is warm and dry. No rash noted. She is not diaphoretic.        Assessment/Plan:     35 y.o. female  at Unknown for:    * Bilateral pulmonary embolism  Patient with history of superior sagittal sinus thrombosis (2017).   Management as per hospital medicine and hematology  -Appears to be doing well this AM, saturating well on RA and hemodynamically stable  -Hemoptysis resolved  -Patient will require Lovenox for remainder of pregnancy    First trimester pregnancy  Ob care established with Dr Ballesteros at [facility] and plans follow up with her.  No ob issues  Aware of safety of heparin and lovenox in pregnancy  OB ultrasound: Single normal appearing 11 week and 1 day intrauterine fetus/pregnancy.  Estimated dated delivery is 2021          Catalina Rogers PA-C  Obstetrics  [facility]

## 2020-08-27 NOTE — PLAN OF CARE
08/27/20 1135   Final Note   Assessment Type Final Discharge Note   Anticipated Discharge Disposition Home   What phone number can be called within the next 1-3 days to see how you are doing after discharge? [phone]   Right [facility] Referral Info   Post Acute Recommendation No Care       John Hartley LMSW 8/27/2020 11:35 AM

## 2020-08-27 NOTE — SUBJECTIVE & OBJECTIVE
Obstetric HPI:  Patient reports no cramping or vaginal bleeding. She reports persistent cough but now coughing up clear nonbloody sputum. Denies SOB unless exerting herself. No fever. +nausea but no emesis and is tolerating po well      Objective:     Vital Signs (Most Recent):  Temp: 98.3 °F (36.8 °C) (08/27/20 0720)  Pulse: 83 (08/27/20 0720)  Resp: 17 (08/27/20 0720)  BP: (!) 124/58 (08/27/20 0720)  SpO2: 97 % (08/27/20 0720) Vital Signs (24h Range):  Temp:  [97.4 °F (36.3 °C)-98.4 °F (36.9 °C)] 98.3 °F (36.8 °C)  Pulse:  [67-89] 83  Resp:  [17-18] 17  SpO2:  [93 %-97 %] 97 %  BP: (100-124)/(53-61) 124/58     Weight: 72.1 kg (158 lb 15.2 oz)  Body mass index is 24.9 kg/m².        Intake/Output Summary (Last 24 hours) at 8/27/2020 0851  Last data filed at 8/27/2020 0800  Gross per 24 hour   Intake 395.73 ml   Output --   Net 395.73 ml       Significant Labs:  Recent Lab Results       08/27/20  0609        aPTT 41.2  Comment:  aPTT therapeutic range = 39-69 seconds           Physical Exam:   Constitutional: She is oriented to person, place, and time. She appears well-developed and well-nourished. No distress.       Cardiovascular: Normal rate, regular rhythm and normal heart sounds.    No murmur heard.   Pulmonary/Chest: Effort normal and breath sounds normal. No respiratory distress. She has no wheezes. She has no rales.        Abdominal: Soft. Bowel sounds are normal. She exhibits no distension. There is no abdominal tenderness. There is no guarding.             Musculoskeletal: No edema.       Neurological: She is alert and oriented to person, place, and time.    Skin: Skin is warm and dry. No rash noted. She is not diaphoretic.

## 2020-08-27 NOTE — SUBJECTIVE & OBJECTIVE
Interval History: No acute events overnight. No further hemoptysis per patient    Oncology Treatment Plan:   [No treatment plan]    Medications:  Continuous Infusions:   heparin (porcine) in D5W 18 Units/kg/hr (08/26/20 1222)     Scheduled Meds:   melatonin  9 mg Oral QHS     PRN Meds:acetaminophen, heparin (PORCINE), heparin (PORCINE), HYDROcodone-acetaminophen, ondansetron, sodium chloride 0.9%     Review of Systems   Constitutional: Negative for activity change, appetite change, chills, diaphoresis, fatigue, fever and unexpected weight change.   HENT: Negative for congestion, mouth sores, nosebleeds, sore throat, trouble swallowing and voice change.    Eyes: Negative for photophobia and visual disturbance.   Respiratory: Negative for cough, chest tightness, shortness of breath and wheezing.    Cardiovascular: Negative for chest pain, palpitations and leg swelling.   Gastrointestinal: Negative for abdominal distention, abdominal pain, anal bleeding, blood in stool, constipation, diarrhea, nausea and vomiting.   Genitourinary: Negative for difficulty urinating, dysuria and hematuria.   Musculoskeletal: Negative for arthralgias, back pain and myalgias.   Skin: Negative for pallor, rash and wound.   Neurological: Negative for dizziness, syncope, weakness and headaches.   Hematological: Negative for adenopathy. Does not bruise/bleed easily.   Psychiatric/Behavioral: The patient is not nervous/anxious.      Objective:     Vital Signs (Most Recent):  Temp: 98.3 °F (36.8 °C) (08/27/20 0720)  Pulse: 83 (08/27/20 0720)  Resp: 17 (08/27/20 0720)  BP: (!) 124/58 (08/27/20 0720)  SpO2: 97 % (08/27/20 0720) Vital Signs (24h Range):  Temp:  [97.4 °F (36.3 °C)-98.4 °F (36.9 °C)] 98.3 °F (36.8 °C)  Pulse:  [67-89] 83  Resp:  [17-18] 17  SpO2:  [93 %-97 %] 97 %  BP: (100-124)/(53-61) 124/58     Weight: 72.1 kg (158 lb 15.2 oz)  Body mass index is 24.9 kg/m².  Body surface area is 1.85 meters squared.      Intake/Output Summary  (Last 24 hours) at 8/27/2020 1046  Last data filed at 8/27/2020 0800  Gross per 24 hour   Intake 395.73 ml   Output --   Net 395.73 ml       Physical Exam  Vitals signs reviewed.   Constitutional:       Appearance: She is well-developed.   HENT:      Head: Normocephalic.      Right Ear: External ear normal.      Left Ear: External ear normal.      Nose: Nose normal.   Eyes:      General: Lids are normal. No scleral icterus.        Right eye: No discharge.         Left eye: No discharge.      Conjunctiva/sclera: Conjunctivae normal.   Neck:      Musculoskeletal: Normal range of motion.      Thyroid: No thyroid mass.   Cardiovascular:      Rate and Rhythm: Normal rate and regular rhythm.      Heart sounds: Normal heart sounds. No murmur.   Pulmonary:      Effort: Pulmonary effort is normal. No respiratory distress.      Breath sounds: Normal breath sounds. No wheezing, rhonchi or rales.   Abdominal:      General: Bowel sounds are normal. There is no distension.      Palpations: Abdomen is soft.      Tenderness: There is no abdominal tenderness.   Genitourinary:     Comments: deferred  Musculoskeletal: Normal range of motion.   Skin:     General: Skin is warm and dry.   Neurological:      Mental Status: She is alert and oriented to person, place, and time.   Psychiatric:         Speech: Speech normal.         Behavior: Behavior normal. Behavior is cooperative.         Thought Content: Thought content normal.         Significant Labs:   BMP:   Recent Labs   Lab 08/26/20  0643   GLU 77      K 3.9      CO2 20*   BUN 7   CREATININE 0.6   CALCIUM 9.3   , CBC:   Recent Labs   Lab 08/25/20  1315 08/26/20  0643 08/27/20  0609   WBC 12.26 9.91 9.05   HGB 14.2 14.1 14.3   HCT 41.9 42.6 44.5    192 189   , LFTs:   Recent Labs   Lab 08/26/20  0643   ALT 12   AST 17   ALKPHOS 81   BILITOT 0.9   PROT 6.9   ALBUMIN 3.1*    and All pertinent labs from the last 24 hours have been reviewed.    Diagnostic Results:  I  have reviewed all pertinent imaging results/findings within the past 24 hours.

## 2020-08-27 NOTE — ASSESSMENT & PLAN NOTE
Patient with history of superior sagittal sinus thrombosis (2017).   Management as per hospital medicine and hematology  -Appears to be doing well this AM, saturating well on RA and hemodynamically stable  -Hemoptysis resolved  -Patient will require Lovenox for remainder of pregnancy

## 2020-08-27 NOTE — ASSESSMENT & PLAN NOTE
Ob care established with Dr Ballesteros at [facility] and plans follow up with her.  No ob issues  Aware of safety of heparin and lovenox in pregnancy  OB ultrasound: Single normal appearing 11 week and 1 day intrauterine fetus/pregnancy.  Estimated dated delivery is 03/16/2021

## 2020-08-27 NOTE — PLAN OF CARE
Pt had no adverse events during shift. Pt free of falls. Call light in reach. Side rails x 2. Heparin remains therapeutic; gtt @ 11.8mL/hr. Pt repositions independently.  VSS. Safety promoted. Chart reviewed, will continue to monitor.

## 2020-09-10 ENCOUNTER — NURSE TRIAGE (OUTPATIENT)
Dept: ADMINISTRATIVE | Facility: CLINIC | Age: 35
End: 2020-09-10

## 2020-09-11 ENCOUNTER — HOSPITAL ENCOUNTER (EMERGENCY)
Facility: HOSPITAL | Age: 35
Discharge: HOME | End: 2020-09-11
Attending: EMERGENCY MEDICINE
Payer: COMMERCIAL

## 2020-09-11 VITALS
SYSTOLIC BLOOD PRESSURE: 99 MMHG | HEART RATE: 80 BPM | TEMPERATURE: 98 F | HEIGHT: 67 IN | OXYGEN SATURATION: 98 % | BODY MASS INDEX: 25.15 KG/M2 | RESPIRATION RATE: 18 BRPM | WEIGHT: 160.25 LBS | DIASTOLIC BLOOD PRESSURE: 63 MMHG

## 2020-09-11 DIAGNOSIS — I26.99 PULMONARY EMBOLISM, UNSPECIFIED CHRONICITY, UNSPECIFIED PULMONARY EMBOLISM TYPE, UNSPECIFIED WHETHER ACUTE COR PULMONALE PRESENT: Primary | ICD-10-CM

## 2020-09-11 DIAGNOSIS — R07.9 CHEST PAIN: ICD-10-CM

## 2020-09-11 LAB
ALBUMIN SERPL BCP-MCNC: 3.5 G/DL (ref 3.5–5.2)
ALP SERPL-CCNC: 70 U/L (ref 55–135)
ALT SERPL W/O P-5'-P-CCNC: 17 U/L (ref 10–44)
ANION GAP SERPL CALC-SCNC: 10 MMOL/L (ref 8–16)
APTT BLDCRRT: 28.3 SEC (ref 21–32)
AST SERPL-CCNC: 19 U/L (ref 10–40)
BASOPHILS # BLD AUTO: 0.02 K/UL (ref 0–0.2)
BASOPHILS NFR BLD: 0.2 % (ref 0–1.9)
BILIRUB SERPL-MCNC: 0.3 MG/DL (ref 0.1–1)
BUN SERPL-MCNC: 8 MG/DL (ref 6–20)
CALCIUM SERPL-MCNC: 9.1 MG/DL (ref 8.7–10.5)
CHLORIDE SERPL-SCNC: 104 MMOL/L (ref 95–110)
CO2 SERPL-SCNC: 24 MMOL/L (ref 23–29)
CREAT SERPL-MCNC: 0.7 MG/DL (ref 0.5–1.4)
DIFFERENTIAL METHOD: ABNORMAL
EOSINOPHIL # BLD AUTO: 0.1 K/UL (ref 0–0.5)
EOSINOPHIL NFR BLD: 0.7 % (ref 0–8)
ERYTHROCYTE [DISTWIDTH] IN BLOOD BY AUTOMATED COUNT: 13.3 % (ref 11.5–14.5)
EST. GFR  (AFRICAN AMERICAN): >60 ML/MIN/1.73 M^2
EST. GFR  (NON AFRICAN AMERICAN): >60 ML/MIN/1.73 M^2
GLUCOSE SERPL-MCNC: 82 MG/DL (ref 70–110)
HCT VFR BLD AUTO: 43.8 % (ref 37–48.5)
HGB BLD-MCNC: 14.3 G/DL (ref 12–16)
IMM GRANULOCYTES # BLD AUTO: 0.05 K/UL (ref 0–0.04)
IMM GRANULOCYTES NFR BLD AUTO: 0.5 % (ref 0–0.5)
INR PPP: 0.9 (ref 0.8–1.2)
LYMPHOCYTES # BLD AUTO: 2.1 K/UL (ref 1–4.8)
LYMPHOCYTES NFR BLD: 19.4 % (ref 18–48)
MCH RBC QN AUTO: 31.2 PG (ref 27–31)
MCHC RBC AUTO-ENTMCNC: 32.6 G/DL (ref 32–36)
MCV RBC AUTO: 95 FL (ref 82–98)
MONOCYTES # BLD AUTO: 0.6 K/UL (ref 0.3–1)
MONOCYTES NFR BLD: 5.4 % (ref 4–15)
NEUTROPHILS # BLD AUTO: 8 K/UL (ref 1.8–7.7)
NEUTROPHILS NFR BLD: 73.8 % (ref 38–73)
NRBC BLD-RTO: 0 /100 WBC
PLATELET # BLD AUTO: 169 K/UL (ref 150–350)
PMV BLD AUTO: 10.9 FL (ref 9.2–12.9)
POTASSIUM SERPL-SCNC: 3.9 MMOL/L (ref 3.5–5.1)
PROT SERPL-MCNC: 7.6 G/DL (ref 6–8.4)
PROTHROMBIN TIME: 9.8 SEC (ref 9–12.5)
RBC # BLD AUTO: 4.59 M/UL (ref 4–5.4)
SODIUM SERPL-SCNC: 138 MMOL/L (ref 136–145)
TROPONIN I SERPL DL<=0.01 NG/ML-MCNC: 0.01 NG/ML (ref 0–0.03)
WBC # BLD AUTO: 10.77 K/UL (ref 3.9–12.7)

## 2020-09-11 PROCEDURE — 99284 EMERGENCY DEPT VISIT MOD MDM: CPT | Mod: 25

## 2020-09-11 PROCEDURE — 93010 EKG 12-LEAD: ICD-10-PCS | Mod: ,,, | Performed by: INTERNAL MEDICINE

## 2020-09-11 PROCEDURE — 84484 ASSAY OF TROPONIN QUANT: CPT

## 2020-09-11 PROCEDURE — 85730 THROMBOPLASTIN TIME PARTIAL: CPT

## 2020-09-11 PROCEDURE — 85610 PROTHROMBIN TIME: CPT

## 2020-09-11 PROCEDURE — 93010 ELECTROCARDIOGRAM REPORT: CPT | Mod: ,,, | Performed by: INTERNAL MEDICINE

## 2020-09-11 PROCEDURE — 85025 COMPLETE CBC W/AUTO DIFF WBC: CPT

## 2020-09-11 PROCEDURE — 80053 COMPREHEN METABOLIC PANEL: CPT

## 2020-09-11 PROCEDURE — 36415 COLL VENOUS BLD VENIPUNCTURE: CPT

## 2020-09-11 PROCEDURE — 93005 ELECTROCARDIOGRAM TRACING: CPT

## 2020-09-11 NOTE — ED NOTES
Patient changed into a gown, placed on continuous cardiac monitor, automatic blood pressure cuff and continuous pulse oximeter.

## 2020-09-11 NOTE — TELEPHONE ENCOUNTER
"Pt had a bilateral PE's a couple weeks ago, she has been having increasing chest pain recently and tonight she awoke in terrible chest pain that was also present in her shoulder, she states it felt like a stabbing pain when she breathed in our out, advised her to go to ER, caller agreed     Reason for Disposition   [1] Chest pain (or "angina") comes and goes AND [2] is happening more often (increasing in frequency) or getting worse (increasing in severity)    Additional Information   Negative: Shock suspected (e.g., cold/pale/clammy skin, too weak to stand, low BP, rapid pulse)   Negative: Difficult to awaken or acting confused (e.g., disoriented, slurred speech)   Negative: Severe difficulty breathing (e.g., struggling for each breath, speaks in single words)   Negative: [1] Chest pain lasts > 5 minutes AND [2] age > 45   Negative: [1] Chest pain lasts > 5 minutes AND [2] age > 30 AND [3] one or more cardiac risk factors (e.g., diabetes, high blood pressure, high cholesterol, smoker, or strong family history of heart disease)   Negative: [1] Chest pain lasts > 5 minutes AND [2] history of heart disease (i.e., angina, heart attack, heart failure, bypass surgery, takes nitroglycerin)   Negative: [1] Chest pain lasts > 5 minutes AND [2] described as crushing, pressure-like, or heavy   Negative: Passed out (i.e., lost consciousness, collapsed and was not responding)   Negative: Heart beating < 50 beats per minute OR > 140 beats per minute   Negative: Visible sweat on face or sweat dripping down face   Negative: Sounds like a life-threatening emergency to the triager   Negative: Followed a chest injury   Negative: SEVERE chest pain    Protocols used: CHEST PAIN-A-AH      "

## 2020-09-11 NOTE — ED PROVIDER NOTES
SCRIBE #1 NOTE: I, Amina Hoover, am scribing for, and in the presence of, Xiomy Tellez MD. I have scribed the entire note.       History     Chief Complaint   Patient presents with    Shortness of Breath     Dx with bilateral PE 2 weeks ago.  States woke tonight with left side chest pain and inability to take a deep breath.  On Lovenox BID     Review of patient's allergies indicates:  No Known Allergies      History of Present Illness     HPI    9/11/2020, 1:15 AM  History obtained from the patient      History of Present Illness: Jo-Ann Ochoa is a 35 y.o. female patient with a PMHx of PE who presents to the Emergency Department for evaluation of SOB which onset gradually 11:30 PM yesterday. Symptoms are constant and moderate in severity. No mitigating or exacerbating factors reported. Associated sxs include L sided pleuritic CP that radiates to collar bone and back. Patient denies any fever, cough, n/v/d, constipation, dysuria, HA, chills, weakness, vaginal bleeding, chest tightness, and all other sxs at this time. Prior Tx includes Tylenol, which did not help. Pt is currently on Lovenox BID. No further complaints or concerns at this time.       Arrival mode: Personal vehicle      PCP: Diomedes Starks MD        Past Medical History:  Past Medical History:   Diagnosis Date    PSVT (paroxysmal supraventricular tachycardia) 3/1/2017    Supraventricular tachycardia        Past Surgical History:  Past Surgical History:   Procedure Laterality Date    WISDOM TOOTH EXTRACTION           Family History:  Family History   Problem Relation Age of Onset    Mitral valve prolapse Mother     Colon cancer Paternal Aunt         great aunt    Atrial fibrillation Paternal Grandfather     Breast cancer Neg Hx     Ovarian cancer Neg Hx     Uterine cancer Neg Hx     Cervical cancer Neg Hx        Social History:  Social History     Tobacco Use    Smoking status: Never Smoker    Smokeless tobacco: Never Used    Substance and Sexual Activity    Alcohol use: Yes     Alcohol/week: 0.0 standard drinks     Comment: seldom    Drug use: No    Sexual activity: Yes     Partners: Male     Birth control/protection: OCP     Comment: mut monog        Review of Systems     Review of Systems   Constitutional: Negative for chills and fever.   HENT: Negative for sore throat.    Respiratory: Positive for shortness of breath. Negative for cough and chest tightness.    Cardiovascular: Positive for chest pain (L sided pleuritic).   Gastrointestinal: Negative for constipation, diarrhea, nausea and vomiting.   Genitourinary: Negative for dysuria and vaginal bleeding.   Musculoskeletal: Negative for back pain.   Skin: Negative for rash.   Neurological: Negative for weakness and headaches.   Hematological: Does not bruise/bleed easily.   All other systems reviewed and are negative.       Physical Exam     Initial Vitals [09/11/20 0058]   BP Pulse Resp Temp SpO2   (!) 140/84 (!) 113 (!) 24 98 °F (36.7 °C) 95 %      MAP       --          Physical Exam  Nursing Notes and Vital Signs Reviewed.  Constitutional: Patient is in no apparent distress. Well-developed and well-nourished.  Head: Atraumatic. Normocephalic.  Eyes: PERRL. EOM intact. Conjunctivae are not pale. No scleral icterus.  ENT: Mucous membranes are moist. Oropharynx is clear and symmetric.    Neck: Supple. Full ROM. No lymphadenopathy.  Cardiovascular: Regular rate. Regular rhythm. No murmurs, rubs, or gallops. Distal pulses are 2+ and symmetric.  Pulmonary/Chest: No respiratory distress. Clear to auscultation bilaterally. No wheezing or rales.  Abdominal: Soft and non-distended.  There is no tenderness.  No rebound, guarding, or rigidity. Good bowel sounds.  Genitourinary: No CVA tenderness  Musculoskeletal: Moves all extremities. No obvious deformities. No edema. No calf tenderness.  Skin: Warm and dry.  Neurological:  Alert, awake, and appropriate.  Normal speech.  No acute focal  "neurological deficits are appreciated.  Psychiatric: Normal affect. Good eye contact. Appropriate in content.     ED Course   Procedures  ED Vital Signs:  Vitals:    09/11/20 0058 09/11/20 0231 09/11/20 0302 09/11/20 0327   BP: (!) 140/84 108/62 (!) 106/58 99/63   Pulse: (!) 113 85 82 80   Resp: (!) 24 16 15 18   Temp: 98 °F (36.7 °C)      TempSrc: Oral      SpO2: 95% 100% 98% 98%   Weight: 72.7 kg (160 lb 4.4 oz)      Height: 5' 7" (1.702 m)          Abnormal Lab Results:  Labs Reviewed   CBC W/ AUTO DIFFERENTIAL - Abnormal; Notable for the following components:       Result Value    Mean Corpuscular Hemoglobin 31.2 (*)     Gran # (ANC) 8.0 (*)     Immature Grans (Abs) 0.05 (*)     Gran% 73.8 (*)     All other components within normal limits   COMPREHENSIVE METABOLIC PANEL   PROTIME-INR   APTT   TROPONIN I        All Lab Results:  No results found for this or any previous visit.      Imaging Results:  Imaging Results    None          The EKG was ordered, reviewed, and independently interpreted by the ED provider.  Interpretation time: 2:26  Rate: 86 BPM  Rhythm: normal sinus rhythm  Interpretation: No acute ST changes. No STEMI.             The Emergency Provider reviewed the vital signs and test results, which are outlined above.     ED Discussion       3:17 AM: Reassessed pt at this time. Discussed with pt all pertinent ED information and results. Discussed pt dx and plan of tx. Gave pt all f/u and return to the ED instructions. All questions and concerns were addressed at this time. Pt expresses understanding of information and instructions, and is comfortable with plan to discharge. Pt is stable for discharge.    I discussed with patient and/or family/caretaker that evaluation in the ED does not suggest any emergent or life threatening medical conditions requiring immediate intervention beyond what was provided in the ED, and I believe patient is safe for discharge.  Regardless, an unremarkable evaluation in " the ED does not preclude the development or presence of a serious of life threatening condition. As such, patient was instructed to return immediately for any worsening or change in current symptoms.         Medical Decision Making:   Clinical Tests:   Lab Tests: Ordered and Reviewed  Medical Tests: Ordered and Reviewed           ED Medication(s):  Medications - No data to display    Discharge Medication List as of 9/11/2020  3:18 AM          Follow-up Information     [facility] HEMATOLOGY/ONCOLOGY Today.    Specialty: Hematology and Oncology  Contact information:  [address]  [facility] [address] [phone]           [facility] - .    Specialty: Emergency Medicine  Why: As needed, If symptoms worsen  Contact information:  [address] [address] [phone]                     Scribe Attestation:   Scribe #1: I performed the above scribed service and the documentation accurately describes the services I performed. I attest to the accuracy of the note.     Attending:   Physician Attestation Statement for Scribe #1: I, Xiomy Tellez MD, personally performed the services described in this documentation, as scribed by Amina Hoover, in my presence, and it is both accurate and complete.           Clinical Impression       ICD-10-CM ICD-9-CM   1. Pulmonary embolism, unspecified chronicity, unspecified pulmonary embolism type, unspecified whether acute cor pulmonale present  I26.99 415.19   2. Chest pain  R07.9 786.50       Disposition:   Disposition: Discharged  Condition: Stable         Xiomy Tellez MD  09/11/20 0528

## 2020-09-15 ENCOUNTER — OFFICE VISIT (OUTPATIENT)
Dept: HEMATOLOGY/ONCOLOGY | Facility: CLINIC | Age: 35
End: 2020-09-15
Payer: COMMERCIAL

## 2020-09-15 ENCOUNTER — LAB VISIT (OUTPATIENT)
Dept: LAB | Facility: HOSPITAL | Age: 35
End: 2020-09-15
Attending: INTERNAL MEDICINE
Payer: COMMERCIAL

## 2020-09-15 VITALS
OXYGEN SATURATION: 99 % | TEMPERATURE: 98 F | RESPIRATION RATE: 14 BRPM | BODY MASS INDEX: 25.53 KG/M2 | HEART RATE: 86 BPM | SYSTOLIC BLOOD PRESSURE: 117 MMHG | WEIGHT: 162.69 LBS | DIASTOLIC BLOOD PRESSURE: 68 MMHG | HEIGHT: 67 IN

## 2020-09-15 DIAGNOSIS — I26.99 BILATERAL PULMONARY EMBOLISM: Primary | ICD-10-CM

## 2020-09-15 DIAGNOSIS — Z86.718 HISTORY OF CEREBRAL VENOUS SINUS THROMBOSIS: ICD-10-CM

## 2020-09-15 DIAGNOSIS — O22.52: ICD-10-CM

## 2020-09-15 DIAGNOSIS — I26.99 BILATERAL PULMONARY EMBOLISM: ICD-10-CM

## 2020-09-15 PROCEDURE — 81240 F2 GENE: CPT

## 2020-09-15 PROCEDURE — 99999 PR PBB SHADOW E&M-EST. PATIENT-LVL III: CPT | Mod: PBBFAC,,, | Performed by: INTERNAL MEDICINE

## 2020-09-15 PROCEDURE — 86356 MONONUCLEAR CELL ANTIGEN: CPT

## 2020-09-15 PROCEDURE — 99214 OFFICE O/P EST MOD 30 MIN: CPT | Mod: S$GLB,,, | Performed by: INTERNAL MEDICINE

## 2020-09-15 PROCEDURE — 86038 ANTINUCLEAR ANTIBODIES: CPT

## 2020-09-15 PROCEDURE — 88185 FLOWCYTOMETRY/TC ADD-ON: CPT | Mod: 91

## 2020-09-15 PROCEDURE — 36415 COLL VENOUS BLD VENIPUNCTURE: CPT

## 2020-09-15 PROCEDURE — 86147 CARDIOLIPIN ANTIBODY EA IG: CPT | Mod: 59

## 2020-09-15 PROCEDURE — 85613 RUSSELL VIPER VENOM DILUTED: CPT

## 2020-09-15 PROCEDURE — 83520 IMMUNOASSAY QUANT NOS NONAB: CPT | Mod: 59

## 2020-09-15 PROCEDURE — 99999 PR PBB SHADOW E&M-EST. PATIENT-LVL III: ICD-10-PCS | Mod: PBBFAC,,, | Performed by: INTERNAL MEDICINE

## 2020-09-15 PROCEDURE — 88188 FLOWCYTOMETRY/READ 9-15: CPT

## 2020-09-15 PROCEDURE — 85303 CLOT INHIBIT PROT C ACTIVITY: CPT

## 2020-09-15 PROCEDURE — 84165 PROTEIN E-PHORESIS SERUM: CPT

## 2020-09-15 PROCEDURE — 88184 FLOWCYTOMETRY/ TC 1 MARKER: CPT

## 2020-09-15 PROCEDURE — 81270 JAK2 GENE: CPT

## 2020-09-15 PROCEDURE — 85300 ANTITHROMBIN III ACTIVITY: CPT

## 2020-09-15 PROCEDURE — 84165 PATHOLOGIST INTERPRETATION SPE: ICD-10-PCS | Mod: 26,,, | Performed by: PATHOLOGY

## 2020-09-15 PROCEDURE — 84165 PROTEIN E-PHORESIS SERUM: CPT | Mod: 26,,, | Performed by: PATHOLOGY

## 2020-09-15 PROCEDURE — 99214 PR OFFICE/OUTPT VISIT, EST, LEVL IV, 30-39 MIN: ICD-10-PCS | Mod: S$GLB,,, | Performed by: INTERNAL MEDICINE

## 2020-09-15 RX ORDER — ENOXAPARIN SODIUM 100 MG/ML
80 INJECTION SUBCUTANEOUS 2 TIMES DAILY
Qty: 48 ML | Refills: 11 | Status: SHIPPED | OUTPATIENT
Start: 2020-09-15 | End: 2020-10-22

## 2020-09-15 NOTE — PROGRESS NOTES
Subjective:       Patient ID: Jo-Ann Ochoa is a 35 y.o. female.    Chief Complaint: Follow-up, Coagulation Disorder, and Results    HPI 35-year-old female in 2nd pregnancy with bilateral pulmonary embolus.  Previous history of cerebral venous thrombosis in pregnancy.  In 2017 hypercoagulable workup completed.  At this time continues on low-molecular weight heparin 1 milligram/kilogram due date March 2020    Past Medical History:   Diagnosis Date    Cerebral venous thrombosis during pregnancy 2017    PSVT (paroxysmal supraventricular tachycardia) 3/1/2017    Pulmonary embolus 2020    Supraventricular tachycardia      Family History   Problem Relation Age of Onset    Mitral valve prolapse Mother     Colon cancer Paternal Aunt         great aunt    Atrial fibrillation Paternal Grandfather     Breast cancer Neg Hx     Ovarian cancer Neg Hx     Uterine cancer Neg Hx     Cervical cancer Neg Hx      Social History     Socioeconomic History    Marital status:      Spouse name: Not on file    Number of children: Not on file    Years of education: Not on file    Highest education level: Not on file   Occupational History    Occupation: RN     Employer: [employer]   Social Needs    Financial resource strain: Not on file    Food insecurity     Worry: Not on file     Inability: Not on file    Transportation needs     Medical: Not on file     Non-medical: Not on file   Tobacco Use    Smoking status: Never Smoker    Smokeless tobacco: Never Used   Substance and Sexual Activity    Alcohol use: Yes     Alcohol/week: 0.0 standard drinks     Comment: seldom    Drug use: No    Sexual activity: Yes     Partners: Male     Birth control/protection: OCP     Comment: mut monog   Lifestyle    Physical activity     Days per week: Not on file     Minutes per session: Not on file    Stress: Not on file   Relationships    Social connections     Talks on phone: Not on file     Gets together:  Not on file     Attends Oriental orthodox service: Not on file     Active member of club or organization: Not on file     Attends meetings of clubs or organizations: Not on file     Relationship status: Not on file   Other Topics Concern    Not on file   Social History Narrative    Not on file     Past Surgical History:   Procedure Laterality Date    WISDOM TOOTH EXTRACTION         Labs:  Lab Results   Component Value Date    WBC 10.77 09/11/2020    HGB 14.3 09/11/2020    HCT 43.8 09/11/2020    MCV 95 09/11/2020     09/11/2020     BMP  Lab Results   Component Value Date     09/11/2020    K 3.9 09/11/2020     09/11/2020    CO2 24 09/11/2020    BUN 8 09/11/2020    CREATININE 0.7 09/11/2020    CALCIUM 9.1 09/11/2020    ANIONGAP 10 09/11/2020    ESTGFRAFRICA >60 09/11/2020    EGFRNONAA >60 09/11/2020     Lab Results   Component Value Date    ALT 17 09/11/2020    AST 19 09/11/2020    ALKPHOS 70 09/11/2020    BILITOT 0.3 09/11/2020       No results found for: IRON, TIBC, FERRITIN, SATURATEDIRO  No results found for: [id number]  No results found for: FOLATE  Lab Results   Component Value Date    TSH 2.834 02/18/2017         Review of Systems   Constitutional: Negative for activity change, appetite change, chills, diaphoresis, fatigue, fever and unexpected weight change.   HENT: Negative for congestion, dental problem, drooling, ear discharge, ear pain, facial swelling, hearing loss, mouth sores, nosebleeds, postnasal drip, rhinorrhea, sinus pressure, sneezing, sore throat, tinnitus, trouble swallowing and voice change.    Eyes: Negative for photophobia, pain, discharge, redness, itching and visual disturbance.   Respiratory: Negative for cough, choking, chest tightness, shortness of breath, wheezing and stridor.    Cardiovascular: Negative for chest pain, palpitations and leg swelling.   Gastrointestinal: Negative for abdominal distention, abdominal pain, anal bleeding, blood in stool, constipation, diarrhea,  nausea, rectal pain and vomiting.   Endocrine: Negative for cold intolerance, heat intolerance, polydipsia, polyphagia and polyuria.   Genitourinary: Negative for decreased urine volume, difficulty urinating, dyspareunia, dysuria, enuresis, flank pain, frequency, genital sores, hematuria, menstrual problem, pelvic pain, urgency, vaginal bleeding, vaginal discharge and vaginal pain.   Musculoskeletal: Negative for arthralgias, back pain, gait problem, joint swelling, myalgias, neck pain and neck stiffness.   Skin: Negative for color change, pallor and rash.   Allergic/Immunologic: Negative for environmental allergies, food allergies and immunocompromised state.   Neurological: Negative for dizziness, tremors, seizures, syncope, facial asymmetry, speech difficulty, weakness, light-headedness, numbness and headaches.   Hematological: Negative for adenopathy. Does not bruise/bleed easily.   Psychiatric/Behavioral: Positive for dysphoric mood. Negative for agitation, behavioral problems, confusion, decreased concentration, hallucinations, self-injury, sleep disturbance and suicidal ideas. The patient is nervous/anxious. The patient is not hyperactive.        Objective:      Physical Exam  Vitals signs reviewed.   Constitutional:       General: She is not in acute distress.     Appearance: She is well-developed. She is not diaphoretic.   HENT:      Head: Normocephalic and atraumatic.      Right Ear: External ear normal.      Left Ear: External ear normal.      Nose: Nose normal.      Right Sinus: No maxillary sinus tenderness or frontal sinus tenderness.      Left Sinus: No maxillary sinus tenderness or frontal sinus tenderness.      Mouth/Throat:      Pharynx: No oropharyngeal exudate.   Eyes:      General: Lids are normal. No scleral icterus.        Right eye: No discharge.         Left eye: No discharge.      Conjunctiva/sclera: Conjunctivae normal.      Right eye: Right conjunctiva is not injected. No hemorrhage.      Left eye: Left conjunctiva is not injected. No hemorrhage.     Pupils: Pupils are equal, round, and reactive to light.   Neck:      Musculoskeletal: Normal range of motion and neck supple.      Thyroid: No thyromegaly.      Vascular: No JVD.      Trachea: No tracheal deviation.   Cardiovascular:      Rate and Rhythm: Normal rate.   Pulmonary:      Effort: Pulmonary effort is normal. No respiratory distress.      Breath sounds: No stridor.   Chest:      Chest wall: No tenderness.   Abdominal:      General: Bowel sounds are normal. There is no distension.      Palpations: Abdomen is soft. There is no hepatomegaly, splenomegaly or mass.      Tenderness: There is no abdominal tenderness. There is no rebound.   Musculoskeletal: Normal range of motion.         General: No tenderness.   Lymphadenopathy:      Cervical: No cervical adenopathy.      Upper Body:      Right upper body: No supraclavicular adenopathy.      Left upper body: No supraclavicular adenopathy.   Skin:     General: Skin is dry.      Findings: No erythema or rash.   Neurological:      Mental Status: She is alert and oriented to person, place, and time.      Cranial Nerves: No cranial nerve deficit.      Coordination: Coordination normal.   Psychiatric:         Mood and Affect: Mood is anxious.         Behavior: Behavior normal.         Thought Content: Thought content normal.         Judgment: Judgment normal.             Assessment:      1. Bilateral pulmonary embolism    2. Cerebral venous thrombosis during pregnancy in second trimester    3. History of cerebral venous sinus thrombosis           Plan:     Reviewed information with the would recommend that she continue on full-dose anticoagulation throughout her pregnancy and at least 6 weeks postpartum will review at time of completion of pregnancy will see back in 3-4 months for review completion of hypercoagulable workup ordered at this point communicate results through electronic patient portal with  her 25 min face-to-face time coordination of care greater 50% time face-to-face with patient        Chad Branham Jr, MD FACP

## 2020-09-16 LAB
ALBUMIN SERPL ELPH-MCNC: 3.52 G/DL (ref 3.35–5.55)
ALPHA1 GLOB SERPL ELPH-MCNC: 0.4 G/DL (ref 0.17–0.41)
ALPHA2 GLOB SERPL ELPH-MCNC: 0.76 G/DL (ref 0.43–0.99)
ANA SER QL IF: NORMAL
B-GLOBULIN SERPL ELPH-MCNC: 0.94 G/DL (ref 0.5–1.1)
GAMMA GLOB SERPL ELPH-MCNC: 1.09 G/DL (ref 0.67–1.58)
KAPPA LC SER QL IA: 0.91 MG/DL (ref 0.33–1.94)
KAPPA LC/LAMBDA SER IA: 0.73 (ref 0.26–1.65)
LAMBDA LC SER QL IA: 1.24 MG/DL (ref 0.57–2.63)
PATHOLOGIST INTERPRETATION SPE: NORMAL
PROT SERPL-MCNC: 6.7 G/DL (ref 6–8.4)

## 2020-09-17 LAB
AT III ACT/NOR PPP CHRO: 94 % (ref 83–118)
LA PPP-IMP: NEGATIVE

## 2020-09-18 LAB
APTT PROTEIN C ACTIVATOR+FV DP/APTT PPP: 110 % (ref 70–140)
CARDIOLIPIN IGA SER IA-ACNC: <9 APL
CARDIOLIPIN IGG SER IA-ACNC: <9.4 GPL (ref 0–14.99)
CARDIOLIPIN IGM SER IA-ACNC: 9.9 MPL (ref 0–12.49)
FLOW CYTOMETRY SPECIALIST REVIEW: NORMAL
PNH GRANULOCYTES: 0 % (ref 0–0.01)
PNH MONOCYTES: 0.02 % (ref 0–0.05)
PNH RBC-COMPLETE AG LOSS: 0 % (ref 0–0.01)
PNH RBC-PARTIAL AG LOSS: 0 % (ref 0–0.99)

## 2020-09-20 LAB
F2 C.20210G>A GENO BLD/T: NORMAL
F2 GENE MUT ANL BLD/T: NORMAL

## 2020-09-23 DIAGNOSIS — I26.99 BILATERAL PULMONARY EMBOLISM: Primary | ICD-10-CM

## 2020-09-23 LAB
MPNR  FINAL DIAGNOSIS: NORMAL
MPNR  SPECIMEN TYPE: NORMAL
MPNR RESULT: NORMAL

## 2020-12-22 ENCOUNTER — OFFICE VISIT (OUTPATIENT)
Dept: HEMATOLOGY/ONCOLOGY | Facility: CLINIC | Age: 35
End: 2020-12-22
Payer: COMMERCIAL

## 2020-12-22 VITALS
OXYGEN SATURATION: 98 % | BODY MASS INDEX: 29.2 KG/M2 | HEART RATE: 87 BPM | WEIGHT: 186.06 LBS | SYSTOLIC BLOOD PRESSURE: 120 MMHG | TEMPERATURE: 97 F | HEIGHT: 67 IN | DIASTOLIC BLOOD PRESSURE: 68 MMHG

## 2020-12-22 DIAGNOSIS — Z86.718 HISTORY OF CEREBRAL VENOUS SINUS THROMBOSIS: ICD-10-CM

## 2020-12-22 DIAGNOSIS — I26.99 BILATERAL PULMONARY EMBOLISM: Primary | ICD-10-CM

## 2020-12-22 DIAGNOSIS — Z79.01 ANTICOAGULANT LONG-TERM USE: ICD-10-CM

## 2020-12-22 PROCEDURE — 99214 OFFICE O/P EST MOD 30 MIN: CPT | Mod: S$GLB,,, | Performed by: INTERNAL MEDICINE

## 2020-12-22 PROCEDURE — 99214 PR OFFICE/OUTPT VISIT, EST, LEVL IV, 30-39 MIN: ICD-10-PCS | Mod: S$GLB,,, | Performed by: INTERNAL MEDICINE

## 2020-12-22 PROCEDURE — 99999 PR PBB SHADOW E&M-EST. PATIENT-LVL III: ICD-10-PCS | Mod: PBBFAC,,, | Performed by: INTERNAL MEDICINE

## 2020-12-22 PROCEDURE — 99999 PR PBB SHADOW E&M-EST. PATIENT-LVL III: CPT | Mod: PBBFAC,,, | Performed by: INTERNAL MEDICINE

## 2020-12-22 RX ORDER — ENOXAPARIN SODIUM 100 MG/ML
INJECTION SUBCUTANEOUS
COMMUNITY
Start: 2020-12-14 | End: 2022-05-12

## 2020-12-22 NOTE — PROGRESS NOTES
Subjective:       Patient ID: Jo-Ann Ochoa is a 35 y.o. female.    Chief Complaint: Coagulation Disorder    HPI 35-year-old female  delivery date March 17th.  Patient continues on low-molecular weight heparin.  Tolerating well considering use of short-acting heparin drip around delivery by obstetrician because of need for quick acting reversal agent; patient with recurrent thromboembolic disease needs lifelong anticoagulation    Past Medical History:   Diagnosis Date    Cerebral venous thrombosis during pregnancy 2017    PSVT (paroxysmal supraventricular tachycardia) 3/1/2017    Pulmonary embolus 2020    Supraventricular tachycardia      Family History   Problem Relation Age of Onset    Mitral valve prolapse Mother     Colon cancer Paternal Aunt         great aunt    Atrial fibrillation Paternal Grandfather     Breast cancer Neg Hx     Ovarian cancer Neg Hx     Uterine cancer Neg Hx     Cervical cancer Neg Hx      Social History     Socioeconomic History    Marital status:      Spouse name: Not on file    Number of children: Not on file    Years of education: Not on file    Highest education level: Not on file   Occupational History    Occupation: RN     Employer: [employer]   Social Needs    Financial resource strain: Not on file    Food insecurity     Worry: Not on file     Inability: Not on file    Transportation needs     Medical: Not on file     Non-medical: Not on file   Tobacco Use    Smoking status: Never Smoker    Smokeless tobacco: Never Used   Substance and Sexual Activity    Alcohol use: Yes     Alcohol/week: 0.0 standard drinks     Comment: seldom    Drug use: No    Sexual activity: Yes     Partners: Male     Birth control/protection: OCP     Comment: mut monog   Lifestyle    Physical activity     Days per week: Not on file     Minutes per session: Not on file    Stress: Not on file   Relationships    Social connections     Talks on phone: Not on  file     Gets together: Not on file     Attends Christian service: Not on file     Active member of club or organization: Not on file     Attends meetings of clubs or organizations: Not on file     Relationship status: Not on file   Other Topics Concern    Not on file   Social History Narrative    Not on file     Past Surgical History:   Procedure Laterality Date    WISDOM TOOTH EXTRACTION         Labs:  Lab Results   Component Value Date    WBC 10.77 09/11/2020    HGB 14.3 09/11/2020    HCT 43.8 09/11/2020    MCV 95 09/11/2020     09/11/2020     BMP  Lab Results   Component Value Date     09/11/2020    K 3.9 09/11/2020     09/11/2020    CO2 24 09/11/2020    BUN 8 09/11/2020    CREATININE 0.7 09/11/2020    CALCIUM 9.1 09/11/2020    ANIONGAP 10 09/11/2020    ESTGFRAFRICA >60 09/11/2020    EGFRNONAA >60 09/11/2020     Lab Results   Component Value Date    ALT 17 09/11/2020    AST 19 09/11/2020    ALKPHOS 70 09/11/2020    BILITOT 0.3 09/11/2020       No results found for: IRON, TIBC, FERRITIN, SATURATEDIRO  No results found for: [id number]  No results found for: FOLATE  Lab Results   Component Value Date    TSH 2.834 02/18/2017         Review of Systems   Constitutional: Negative for activity change, appetite change, chills, diaphoresis, fatigue, fever and unexpected weight change.   HENT: Negative for congestion, dental problem, drooling, ear discharge, ear pain, facial swelling, hearing loss, mouth sores, nosebleeds, postnasal drip, rhinorrhea, sinus pressure, sneezing, sore throat, tinnitus, trouble swallowing and voice change.    Eyes: Negative for photophobia, pain, discharge, redness, itching and visual disturbance.   Respiratory: Negative for cough, choking, chest tightness, shortness of breath, wheezing and stridor.    Cardiovascular: Negative for chest pain, palpitations and leg swelling.   Gastrointestinal: Negative for abdominal distention, abdominal pain, anal bleeding, blood in stool,  constipation, diarrhea, nausea, rectal pain and vomiting.   Endocrine: Negative for cold intolerance, heat intolerance, polydipsia, polyphagia and polyuria.   Genitourinary: Negative for decreased urine volume, difficulty urinating, dyspareunia, dysuria, enuresis, flank pain, frequency, genital sores, hematuria, menstrual problem, pelvic pain, urgency, vaginal bleeding, vaginal discharge and vaginal pain.   Musculoskeletal: Negative for arthralgias, back pain, gait problem, joint swelling, myalgias, neck pain and neck stiffness.   Skin: Negative for color change, pallor and rash.   Allergic/Immunologic: Negative for environmental allergies, food allergies and immunocompromised state.   Neurological: Negative for dizziness, tremors, seizures, syncope, facial asymmetry, speech difficulty, weakness, light-headedness, numbness and headaches.   Hematological: Negative for adenopathy. Does not bruise/bleed easily.   Psychiatric/Behavioral: Positive for dysphoric mood. Negative for agitation, behavioral problems, confusion, decreased concentration, hallucinations, self-injury, sleep disturbance and suicidal ideas. The patient is nervous/anxious. The patient is not hyperactive.        Objective:      Physical Exam  Vitals signs reviewed.   Constitutional:       General: She is not in acute distress.     Appearance: She is well-developed. She is not diaphoretic.   HENT:      Head: Normocephalic and atraumatic.      Right Ear: External ear normal.      Left Ear: External ear normal.      Nose: Nose normal.      Right Sinus: No maxillary sinus tenderness or frontal sinus tenderness.      Left Sinus: No maxillary sinus tenderness or frontal sinus tenderness.      Mouth/Throat:      Pharynx: No oropharyngeal exudate.   Eyes:      General: Lids are normal. No scleral icterus.        Right eye: No discharge.         Left eye: No discharge.      Conjunctiva/sclera: Conjunctivae normal.      Right eye: Right conjunctiva is not  injected. No hemorrhage.     Left eye: Left conjunctiva is not injected. No hemorrhage.     Pupils: Pupils are equal, round, and reactive to light.   Neck:      Musculoskeletal: Normal range of motion and neck supple.      Thyroid: No thyromegaly.      Vascular: No JVD.      Trachea: No tracheal deviation.   Cardiovascular:      Rate and Rhythm: Normal rate.   Pulmonary:      Effort: Pulmonary effort is normal. No respiratory distress.      Breath sounds: No stridor.   Chest:      Chest wall: No tenderness.   Abdominal:      General: Bowel sounds are normal. There is no distension.      Palpations: Abdomen is soft. There is no hepatomegaly, splenomegaly or mass.      Tenderness: There is no abdominal tenderness. There is no rebound.   Musculoskeletal: Normal range of motion.         General: No tenderness.   Lymphadenopathy:      Cervical: No cervical adenopathy.      Upper Body:      Right upper body: No supraclavicular adenopathy.      Left upper body: No supraclavicular adenopathy.   Skin:     General: Skin is dry.      Findings: No erythema or rash.   Neurological:      Mental Status: She is alert and oriented to person, place, and time.      Cranial Nerves: No cranial nerve deficit.      Coordination: Coordination normal.   Psychiatric:         Behavior: Behavior normal.         Thought Content: Thought content normal.         Judgment: Judgment normal.             Assessment:      1. Bilateral pulmonary embolism    2. Anticoagulant long-term use    3. History of cerebral venous sinus thrombosis           Plan:     At this point would recommend continuation of low-molecular heparin 1 milligram/kilogram until it least 8 weeks postpartum and then will determine long-term anticoagulation reviewed information with her articles from up-to-date followed in given to her for hard copy.  Discussed implications and answered questions follow-up with me as detailed can switch to short-acting heparin around delivery date  per OB.  25 min face-to-face time coordination of care greater 50% time face-to-face patient will see back in May of 2021 if she wishes to be seen prior to delivery will certainly be happy to do so        Chad Branham Jr, MD FACP

## 2021-02-04 ENCOUNTER — PATIENT MESSAGE (OUTPATIENT)
Dept: HEMATOLOGY/ONCOLOGY | Facility: CLINIC | Age: 36
End: 2021-02-04

## 2021-05-10 ENCOUNTER — PATIENT MESSAGE (OUTPATIENT)
Dept: RESEARCH | Facility: HOSPITAL | Age: 36
End: 2021-05-10

## 2022-06-23 NOTE — TELEPHONE ENCOUNTER
Patient was advised to go to ER at [facility].   PROVIDER:[TOKEN:[[id number]],FOLLOWUP:[1 week]],PROVIDER:[TOKEN:[[id number]],FOLLOWUP:[2 weeks]],PROVIDER:[TOKEN:[[id number]],FOLLOWUP:[1 week]]